# Patient Record
Sex: FEMALE | Race: WHITE | NOT HISPANIC OR LATINO | Employment: OTHER | URBAN - METROPOLITAN AREA
[De-identification: names, ages, dates, MRNs, and addresses within clinical notes are randomized per-mention and may not be internally consistent; named-entity substitution may affect disease eponyms.]

---

## 2023-11-04 ENCOUNTER — APPOINTMENT (OUTPATIENT)
Dept: RADIOLOGY | Facility: HOSPITAL | Age: 78
DRG: 190 | End: 2023-11-04
Payer: MEDICARE

## 2023-11-04 ENCOUNTER — HOSPITAL ENCOUNTER (INPATIENT)
Facility: HOSPITAL | Age: 78
LOS: 2 days | Discharge: HOME | DRG: 190 | End: 2023-11-08
Attending: INTERNAL MEDICINE | Admitting: INTERNAL MEDICINE
Payer: MEDICARE

## 2023-11-04 DIAGNOSIS — J44.1 COPD EXACERBATION (MULTI): Primary | ICD-10-CM

## 2023-11-04 LAB
ALBUMIN SERPL BCP-MCNC: 3.9 G/DL (ref 3.4–5)
ALP SERPL-CCNC: 78 U/L (ref 33–136)
ALT SERPL W P-5'-P-CCNC: 18 U/L (ref 7–45)
ANION GAP SERPL CALC-SCNC: 11 MMOL/L (ref 10–20)
AST SERPL W P-5'-P-CCNC: 18 U/L (ref 9–39)
BASOPHILS # BLD AUTO: 0.06 X10*3/UL (ref 0–0.1)
BASOPHILS NFR BLD AUTO: 0.7 %
BILIRUB SERPL-MCNC: 0.5 MG/DL (ref 0–1.2)
BNP SERPL-MCNC: 37 PG/ML (ref 0–99)
BUN SERPL-MCNC: 13 MG/DL (ref 6–23)
CALCIUM SERPL-MCNC: 8.6 MG/DL (ref 8.6–10.3)
CARDIAC TROPONIN I PNL SERPL HS: 10 NG/L (ref 0–13)
CHLORIDE SERPL-SCNC: 105 MMOL/L (ref 98–107)
CO2 SERPL-SCNC: 26 MMOL/L (ref 21–32)
CREAT SERPL-MCNC: 0.86 MG/DL (ref 0.5–1.05)
EOSINOPHIL # BLD AUTO: 0.75 X10*3/UL (ref 0–0.4)
EOSINOPHIL NFR BLD AUTO: 8.4 %
ERYTHROCYTE [DISTWIDTH] IN BLOOD BY AUTOMATED COUNT: 12.5 % (ref 11.5–14.5)
FLUAV RNA RESP QL NAA+PROBE: NOT DETECTED
FLUBV RNA RESP QL NAA+PROBE: NOT DETECTED
GFR SERPL CREATININE-BSD FRML MDRD: 68 ML/MIN/1.73M*2
GLUCOSE SERPL-MCNC: 103 MG/DL (ref 74–99)
HCT VFR BLD AUTO: 44.4 % (ref 36–46)
HGB BLD-MCNC: 14.4 G/DL (ref 12–16)
IMM GRANULOCYTES # BLD AUTO: 0.05 X10*3/UL (ref 0–0.5)
IMM GRANULOCYTES NFR BLD AUTO: 0.6 % (ref 0–0.9)
INR PPP: 1 (ref 0.9–1.1)
LYMPHOCYTES # BLD AUTO: 1.93 X10*3/UL (ref 0.8–3)
LYMPHOCYTES NFR BLD AUTO: 21.6 %
MAGNESIUM SERPL-MCNC: 1.93 MG/DL (ref 1.6–2.4)
MCH RBC QN AUTO: 30.6 PG (ref 26–34)
MCHC RBC AUTO-ENTMCNC: 32.4 G/DL (ref 32–36)
MCV RBC AUTO: 94 FL (ref 80–100)
MONOCYTES # BLD AUTO: 0.44 X10*3/UL (ref 0.05–0.8)
MONOCYTES NFR BLD AUTO: 4.9 %
NEUTROPHILS # BLD AUTO: 5.71 X10*3/UL (ref 1.6–5.5)
NEUTROPHILS NFR BLD AUTO: 63.8 %
NRBC BLD-RTO: 0 /100 WBCS (ref 0–0)
PLATELET # BLD AUTO: 122 X10*3/UL (ref 150–450)
POTASSIUM SERPL-SCNC: 3.2 MMOL/L (ref 3.5–5.3)
PROT SERPL-MCNC: 6.7 G/DL (ref 6.4–8.2)
PROTHROMBIN TIME: 11.1 SECONDS (ref 9.8–12.8)
RBC # BLD AUTO: 4.71 X10*6/UL (ref 4–5.2)
SARS-COV-2 RNA RESP QL NAA+PROBE: NOT DETECTED
SODIUM SERPL-SCNC: 139 MMOL/L (ref 136–145)
WBC # BLD AUTO: 8.9 X10*3/UL (ref 4.4–11.3)

## 2023-11-04 PROCEDURE — 83735 ASSAY OF MAGNESIUM: CPT | Performed by: PHYSICIAN ASSISTANT

## 2023-11-04 PROCEDURE — 84484 ASSAY OF TROPONIN QUANT: CPT | Performed by: PHYSICIAN ASSISTANT

## 2023-11-04 PROCEDURE — 85025 COMPLETE CBC W/AUTO DIFF WBC: CPT | Performed by: PHYSICIAN ASSISTANT

## 2023-11-04 PROCEDURE — 85610 PROTHROMBIN TIME: CPT | Performed by: PHYSICIAN ASSISTANT

## 2023-11-04 PROCEDURE — 96374 THER/PROPH/DIAG INJ IV PUSH: CPT

## 2023-11-04 PROCEDURE — 84443 ASSAY THYROID STIM HORMONE: CPT | Performed by: INTERNAL MEDICINE

## 2023-11-04 PROCEDURE — 2500000002 HC RX 250 W HCPCS SELF ADMINISTERED DRUGS (ALT 637 FOR MEDICARE OP, ALT 636 FOR OP/ED): Performed by: PHYSICIAN ASSISTANT

## 2023-11-04 PROCEDURE — 99285 EMERGENCY DEPT VISIT HI MDM: CPT | Mod: 25

## 2023-11-04 PROCEDURE — 83880 ASSAY OF NATRIURETIC PEPTIDE: CPT | Performed by: PHYSICIAN ASSISTANT

## 2023-11-04 PROCEDURE — 36415 COLL VENOUS BLD VENIPUNCTURE: CPT | Performed by: PHYSICIAN ASSISTANT

## 2023-11-04 PROCEDURE — 71045 X-RAY EXAM CHEST 1 VIEW: CPT | Mod: FOREIGN READ | Performed by: RADIOLOGY

## 2023-11-04 PROCEDURE — 80053 COMPREHEN METABOLIC PANEL: CPT | Performed by: PHYSICIAN ASSISTANT

## 2023-11-04 PROCEDURE — 71045 X-RAY EXAM CHEST 1 VIEW: CPT | Mod: FY,FR

## 2023-11-04 PROCEDURE — 87636 SARSCOV2 & INF A&B AMP PRB: CPT | Performed by: PHYSICIAN ASSISTANT

## 2023-11-04 PROCEDURE — 82607 VITAMIN B-12: CPT | Mod: GEALAB | Performed by: INTERNAL MEDICINE

## 2023-11-04 RX ORDER — ALBUTEROL SULFATE 0.83 MG/ML
2.5 SOLUTION RESPIRATORY (INHALATION) ONCE
Status: COMPLETED | OUTPATIENT
Start: 2023-11-04 | End: 2023-11-04

## 2023-11-04 RX ORDER — IPRATROPIUM BROMIDE AND ALBUTEROL SULFATE 2.5; .5 MG/3ML; MG/3ML
3 SOLUTION RESPIRATORY (INHALATION) ONCE
Status: COMPLETED | OUTPATIENT
Start: 2023-11-04 | End: 2023-11-04

## 2023-11-04 RX ADMIN — ALBUTEROL SULFATE 2.5 MG: 2.5 SOLUTION RESPIRATORY (INHALATION) at 22:23

## 2023-11-04 RX ADMIN — IPRATROPIUM BROMIDE AND ALBUTEROL SULFATE 3 ML: .5; 3 SOLUTION RESPIRATORY (INHALATION) at 22:37

## 2023-11-04 ASSESSMENT — COLUMBIA-SUICIDE SEVERITY RATING SCALE - C-SSRS
6. HAVE YOU EVER DONE ANYTHING, STARTED TO DO ANYTHING, OR PREPARED TO DO ANYTHING TO END YOUR LIFE?: NO
2. HAVE YOU ACTUALLY HAD ANY THOUGHTS OF KILLING YOURSELF?: NO
1. IN THE PAST MONTH, HAVE YOU WISHED YOU WERE DEAD OR WISHED YOU COULD GO TO SLEEP AND NOT WAKE UP?: NO

## 2023-11-04 ASSESSMENT — LIFESTYLE VARIABLES
EVER FELT BAD OR GUILTY ABOUT YOUR DRINKING: NO
REASON UNABLE TO ASSESS: NO
HAVE YOU EVER FELT YOU SHOULD CUT DOWN ON YOUR DRINKING: NO
EVER HAD A DRINK FIRST THING IN THE MORNING TO STEADY YOUR NERVES TO GET RID OF A HANGOVER: NO
HAVE PEOPLE ANNOYED YOU BY CRITICIZING YOUR DRINKING: NO

## 2023-11-04 ASSESSMENT — PAIN SCALES - GENERAL: PAINLEVEL_OUTOF10: 0 - NO PAIN

## 2023-11-04 ASSESSMENT — PAIN - FUNCTIONAL ASSESSMENT: PAIN_FUNCTIONAL_ASSESSMENT: 0-10

## 2023-11-05 PROBLEM — J44.1 COPD EXACERBATION (MULTI): Status: ACTIVE | Noted: 2023-11-05

## 2023-11-05 PROBLEM — D69.6 THROMBOCYTOPENIA (CMS-HCC): Status: ACTIVE | Noted: 2023-11-05

## 2023-11-05 PROBLEM — E03.9 HYPOTHYROIDISM: Status: ACTIVE | Noted: 2023-11-05

## 2023-11-05 PROBLEM — E87.6 HYPOKALEMIA: Status: ACTIVE | Noted: 2023-11-05

## 2023-11-05 LAB
ALBUMIN SERPL BCP-MCNC: 4.3 G/DL (ref 3.4–5)
ALP SERPL-CCNC: 82 U/L (ref 33–136)
ALT SERPL W P-5'-P-CCNC: 19 U/L (ref 7–45)
ANION GAP SERPL CALC-SCNC: 14 MMOL/L (ref 10–20)
AST SERPL W P-5'-P-CCNC: 18 U/L (ref 9–39)
BASOPHILS # BLD AUTO: 0 X10*3/UL (ref 0–0.1)
BASOPHILS NFR BLD AUTO: 0 %
BILIRUB SERPL-MCNC: 0.5 MG/DL (ref 0–1.2)
BUN SERPL-MCNC: 13 MG/DL (ref 6–23)
CALCIUM SERPL-MCNC: 9 MG/DL (ref 8.6–10.3)
CHLORIDE SERPL-SCNC: 105 MMOL/L (ref 98–107)
CO2 SERPL-SCNC: 24 MMOL/L (ref 21–32)
CREAT SERPL-MCNC: 0.83 MG/DL (ref 0.5–1.05)
EOSINOPHIL # BLD AUTO: 0 X10*3/UL (ref 0–0.4)
EOSINOPHIL NFR BLD AUTO: 0 %
ERYTHROCYTE [DISTWIDTH] IN BLOOD BY AUTOMATED COUNT: 12.5 % (ref 11.5–14.5)
FOLATE SERPL-MCNC: >24 NG/ML
GFR SERPL CREATININE-BSD FRML MDRD: 71 ML/MIN/1.73M*2
GLUCOSE SERPL-MCNC: 134 MG/DL (ref 74–99)
HCT VFR BLD AUTO: 42.4 % (ref 36–46)
HGB BLD-MCNC: 13.5 G/DL (ref 12–16)
HOLD SPECIMEN: NORMAL
IMM GRANULOCYTES # BLD AUTO: 0.04 X10*3/UL (ref 0–0.5)
IMM GRANULOCYTES NFR BLD AUTO: 0.7 % (ref 0–0.9)
LYMPHOCYTES # BLD AUTO: 0.74 X10*3/UL (ref 0.8–3)
LYMPHOCYTES NFR BLD AUTO: 12.9 %
MAGNESIUM SERPL-MCNC: 1.99 MG/DL (ref 1.6–2.4)
MCH RBC QN AUTO: 30.3 PG (ref 26–34)
MCHC RBC AUTO-ENTMCNC: 31.8 G/DL (ref 32–36)
MCV RBC AUTO: 95 FL (ref 80–100)
MONOCYTES # BLD AUTO: 0.06 X10*3/UL (ref 0.05–0.8)
MONOCYTES NFR BLD AUTO: 1 %
NEUTROPHILS # BLD AUTO: 4.89 X10*3/UL (ref 1.6–5.5)
NEUTROPHILS NFR BLD AUTO: 85.4 %
NRBC BLD-RTO: 0 /100 WBCS (ref 0–0)
PLATELET # BLD AUTO: 122 X10*3/UL (ref 150–450)
POTASSIUM SERPL-SCNC: 4.2 MMOL/L (ref 3.5–5.3)
PROT SERPL-MCNC: 7.4 G/DL (ref 6.4–8.2)
RBC # BLD AUTO: 4.46 X10*6/UL (ref 4–5.2)
SODIUM SERPL-SCNC: 139 MMOL/L (ref 136–145)
TSH SERPL-ACNC: 1.36 MIU/L (ref 0.44–3.98)
VIT B12 SERPL-MCNC: 495 PG/ML (ref 211–911)
WBC # BLD AUTO: 5.7 X10*3/UL (ref 4.4–11.3)

## 2023-11-05 PROCEDURE — G0378 HOSPITAL OBSERVATION PER HR: HCPCS

## 2023-11-05 PROCEDURE — 2500000004 HC RX 250 GENERAL PHARMACY W/ HCPCS (ALT 636 FOR OP/ED): Performed by: NURSE PRACTITIONER

## 2023-11-05 PROCEDURE — 94760 N-INVAS EAR/PLS OXIMETRY 1: CPT

## 2023-11-05 PROCEDURE — 94667 MNPJ CHEST WALL 1ST: CPT

## 2023-11-05 PROCEDURE — 94664 DEMO&/EVAL PT USE INHALER: CPT

## 2023-11-05 PROCEDURE — 99223 1ST HOSP IP/OBS HIGH 75: CPT | Performed by: INTERNAL MEDICINE

## 2023-11-05 PROCEDURE — 2500000004 HC RX 250 GENERAL PHARMACY W/ HCPCS (ALT 636 FOR OP/ED): Performed by: INTERNAL MEDICINE

## 2023-11-05 PROCEDURE — 80053 COMPREHEN METABOLIC PANEL: CPT | Performed by: INTERNAL MEDICINE

## 2023-11-05 PROCEDURE — 36415 COLL VENOUS BLD VENIPUNCTURE: CPT | Performed by: INTERNAL MEDICINE

## 2023-11-05 PROCEDURE — 82746 ASSAY OF FOLIC ACID SERUM: CPT | Mod: GEALAB | Performed by: INTERNAL MEDICINE

## 2023-11-05 PROCEDURE — 2500000002 HC RX 250 W HCPCS SELF ADMINISTERED DRUGS (ALT 637 FOR MEDICARE OP, ALT 636 FOR OP/ED): Performed by: NURSE PRACTITIONER

## 2023-11-05 PROCEDURE — 94640 AIRWAY INHALATION TREATMENT: CPT

## 2023-11-05 PROCEDURE — 36415 COLL VENOUS BLD VENIPUNCTURE: CPT | Mod: GEALAB | Performed by: INTERNAL MEDICINE

## 2023-11-05 PROCEDURE — 94668 MNPJ CHEST WALL SBSQ: CPT

## 2023-11-05 PROCEDURE — 2500000001 HC RX 250 WO HCPCS SELF ADMINISTERED DRUGS (ALT 637 FOR MEDICARE OP): Performed by: INTERNAL MEDICINE

## 2023-11-05 PROCEDURE — 2500000002 HC RX 250 W HCPCS SELF ADMINISTERED DRUGS (ALT 637 FOR MEDICARE OP, ALT 636 FOR OP/ED): Performed by: INTERNAL MEDICINE

## 2023-11-05 PROCEDURE — 85025 COMPLETE CBC W/AUTO DIFF WBC: CPT | Performed by: INTERNAL MEDICINE

## 2023-11-05 PROCEDURE — 83735 ASSAY OF MAGNESIUM: CPT | Performed by: INTERNAL MEDICINE

## 2023-11-05 RX ORDER — BUDESONIDE 0.5 MG/2ML
0.5 INHALANT ORAL
Status: DISCONTINUED | OUTPATIENT
Start: 2023-11-05 | End: 2023-11-08 | Stop reason: HOSPADM

## 2023-11-05 RX ORDER — LEVOTHYROXINE SODIUM 112 UG/1
112 TABLET ORAL
COMMUNITY

## 2023-11-05 RX ORDER — CITALOPRAM 20 MG/1
20 TABLET, FILM COATED ORAL DAILY
COMMUNITY

## 2023-11-05 RX ORDER — POTASSIUM CHLORIDE 20 MEQ/1
40 TABLET, EXTENDED RELEASE ORAL ONCE
Status: COMPLETED | OUTPATIENT
Start: 2023-11-05 | End: 2023-11-05

## 2023-11-05 RX ORDER — BUDESONIDE 0.5 MG/2ML
0.5 INHALANT ORAL 2 TIMES DAILY
COMMUNITY
End: 2024-01-30 | Stop reason: ALTCHOICE

## 2023-11-05 RX ORDER — CETIRIZINE HYDROCHLORIDE 10 MG/1
1 TABLET ORAL DAILY
COMMUNITY

## 2023-11-05 RX ORDER — NAPROXEN 500 MG/1
500 TABLET ORAL ONCE
Status: COMPLETED | OUTPATIENT
Start: 2023-11-05 | End: 2023-11-05

## 2023-11-05 RX ORDER — IPRATROPIUM BROMIDE AND ALBUTEROL SULFATE 2.5; .5 MG/3ML; MG/3ML
3 SOLUTION RESPIRATORY (INHALATION)
Status: DISCONTINUED | OUTPATIENT
Start: 2023-11-05 | End: 2023-11-05

## 2023-11-05 RX ORDER — AZITHROMYCIN 500 MG/1
500 TABLET, FILM COATED ORAL
Status: DISCONTINUED | OUTPATIENT
Start: 2023-11-05 | End: 2023-11-05

## 2023-11-05 RX ORDER — LEVOTHYROXINE SODIUM 112 UG/1
112 TABLET ORAL
Status: DISCONTINUED | OUTPATIENT
Start: 2023-11-05 | End: 2023-11-08 | Stop reason: HOSPADM

## 2023-11-05 RX ORDER — POTASSIUM CHLORIDE 20 MEQ/1
40 TABLET, EXTENDED RELEASE ORAL ONCE
Status: DISCONTINUED | OUTPATIENT
Start: 2023-11-05 | End: 2023-11-06

## 2023-11-05 RX ORDER — MONTELUKAST SODIUM 10 MG/1
10 TABLET ORAL NIGHTLY
COMMUNITY

## 2023-11-05 RX ORDER — GUAIFENESIN 600 MG/1
1200 TABLET, EXTENDED RELEASE ORAL 2 TIMES DAILY
Status: DISCONTINUED | OUTPATIENT
Start: 2023-11-05 | End: 2023-11-08 | Stop reason: HOSPADM

## 2023-11-05 RX ORDER — ERGOCALCIFEROL 1.25 MG/1
1.25 CAPSULE ORAL
COMMUNITY

## 2023-11-05 RX ORDER — ALBUTEROL SULFATE 0.83 MG/ML
2.5 SOLUTION RESPIRATORY (INHALATION) EVERY 2 HOUR PRN
Status: DISCONTINUED | OUTPATIENT
Start: 2023-11-05 | End: 2023-11-08 | Stop reason: HOSPADM

## 2023-11-05 RX ORDER — GUAIFENESIN 600 MG/1
1200 TABLET, EXTENDED RELEASE ORAL 2 TIMES DAILY
COMMUNITY
End: 2023-11-08 | Stop reason: HOSPADM

## 2023-11-05 RX ORDER — MONTELUKAST SODIUM 10 MG/1
10 TABLET ORAL NIGHTLY
Status: DISCONTINUED | OUTPATIENT
Start: 2023-11-05 | End: 2023-11-08 | Stop reason: HOSPADM

## 2023-11-05 RX ORDER — ALBUTEROL SULFATE 0.83 MG/ML
2.5 SOLUTION RESPIRATORY (INHALATION)
Status: DISCONTINUED | OUTPATIENT
Start: 2023-11-05 | End: 2023-11-05

## 2023-11-05 RX ORDER — LORATADINE 10 MG/1
10 TABLET ORAL DAILY
Status: DISCONTINUED | OUTPATIENT
Start: 2023-11-05 | End: 2023-11-08 | Stop reason: HOSPADM

## 2023-11-05 RX ORDER — MULTIVIT-MIN/IRON FUM/FOLIC AC 7.5 MG-4
1 TABLET ORAL DAILY
Status: DISCONTINUED | OUTPATIENT
Start: 2023-11-05 | End: 2023-11-08 | Stop reason: HOSPADM

## 2023-11-05 RX ORDER — CITALOPRAM 20 MG/1
20 TABLET, FILM COATED ORAL DAILY
Status: DISCONTINUED | OUTPATIENT
Start: 2023-11-05 | End: 2023-11-08 | Stop reason: HOSPADM

## 2023-11-05 RX ORDER — ALBUTEROL SULFATE 0.83 MG/ML
2.5 SOLUTION RESPIRATORY (INHALATION)
COMMUNITY

## 2023-11-05 RX ORDER — BISMUTH SUBSALICYLATE 262 MG
1 TABLET,CHEWABLE ORAL DAILY
Status: DISCONTINUED | OUTPATIENT
Start: 2023-11-05 | End: 2023-11-05

## 2023-11-05 RX ORDER — BISMUTH SUBSALICYLATE 262 MG
1 TABLET,CHEWABLE ORAL DAILY
COMMUNITY
End: 2024-01-30 | Stop reason: SDUPTHER

## 2023-11-05 RX ORDER — DOXYCYCLINE HYCLATE 100 MG
100 TABLET ORAL EVERY 12 HOURS SCHEDULED
Status: DISCONTINUED | OUTPATIENT
Start: 2023-11-05 | End: 2023-11-08 | Stop reason: HOSPADM

## 2023-11-05 RX ORDER — IPRATROPIUM BROMIDE AND ALBUTEROL SULFATE 2.5; .5 MG/3ML; MG/3ML
3 SOLUTION RESPIRATORY (INHALATION)
Status: DISCONTINUED | OUTPATIENT
Start: 2023-11-05 | End: 2023-11-08 | Stop reason: HOSPADM

## 2023-11-05 RX ADMIN — IPRATROPIUM BROMIDE AND ALBUTEROL SULFATE 3 ML: 2.5; .5 SOLUTION RESPIRATORY (INHALATION) at 12:55

## 2023-11-05 RX ADMIN — NAPROXEN 500 MG: 500 TABLET ORAL at 15:38

## 2023-11-05 RX ADMIN — MONTELUKAST 10 MG: 10 TABLET, FILM COATED ORAL at 20:04

## 2023-11-05 RX ADMIN — POTASSIUM CHLORIDE 40 MEQ: 1500 TABLET, EXTENDED RELEASE ORAL at 09:20

## 2023-11-05 RX ADMIN — BUDESONIDE 0.5 MG: 0.5 INHALANT RESPIRATORY (INHALATION) at 19:49

## 2023-11-05 RX ADMIN — METHYLPREDNISOLONE SODIUM SUCCINATE 40 MG: 40 INJECTION, POWDER, FOR SOLUTION INTRAMUSCULAR; INTRAVENOUS at 15:38

## 2023-11-05 RX ADMIN — DOXYCYCLINE HYCLATE 100 MG: 100 TABLET, COATED ORAL at 20:04

## 2023-11-05 RX ADMIN — LORATADINE 10 MG: 10 TABLET ORAL at 09:20

## 2023-11-05 RX ADMIN — METHYLPREDNISOLONE SODIUM SUCCINATE 40 MG: 40 INJECTION, POWDER, FOR SOLUTION INTRAMUSCULAR; INTRAVENOUS at 01:07

## 2023-11-05 RX ADMIN — GUAIFENESIN 1200 MG: 600 TABLET ORAL at 20:04

## 2023-11-05 RX ADMIN — METHYLPREDNISOLONE SODIUM SUCCINATE 40 MG: 40 INJECTION, POWDER, FOR SOLUTION INTRAMUSCULAR; INTRAVENOUS at 09:20

## 2023-11-05 RX ADMIN — IPRATROPIUM BROMIDE AND ALBUTEROL SULFATE 3 ML: 2.5; .5 SOLUTION RESPIRATORY (INHALATION) at 19:49

## 2023-11-05 RX ADMIN — BUDESONIDE 0.5 MG: 0.5 INHALANT RESPIRATORY (INHALATION) at 09:07

## 2023-11-05 RX ADMIN — CITALOPRAM HYDROBROMIDE 20 MG: 20 TABLET ORAL at 09:20

## 2023-11-05 RX ADMIN — ALBUTEROL SULFATE 2.5 MG: 2.5 SOLUTION RESPIRATORY (INHALATION) at 02:49

## 2023-11-05 RX ADMIN — MULTIPLE VITAMINS W/ MINERALS TAB 1 TABLET: TAB at 09:20

## 2023-11-05 RX ADMIN — METHYLPREDNISOLONE SODIUM SUCCINATE 40 MG: 40 INJECTION, POWDER, FOR SOLUTION INTRAMUSCULAR; INTRAVENOUS at 20:04

## 2023-11-05 RX ADMIN — GUAIFENESIN 1200 MG: 600 TABLET ORAL at 09:19

## 2023-11-05 RX ADMIN — IPRATROPIUM BROMIDE AND ALBUTEROL SULFATE 3 ML: 2.5; .5 SOLUTION RESPIRATORY (INHALATION) at 09:08

## 2023-11-05 RX ADMIN — DOXYCYCLINE HYCLATE 100 MG: 100 TABLET, COATED ORAL at 09:20

## 2023-11-05 SDOH — ECONOMIC STABILITY: INCOME INSECURITY: IN THE LAST 12 MONTHS, WAS THERE A TIME WHEN YOU WERE NOT ABLE TO PAY THE MORTGAGE OR RENT ON TIME?: NO

## 2023-11-05 SDOH — SOCIAL STABILITY: SOCIAL INSECURITY
WITHIN THE LAST YEAR, HAVE TO BEEN RAPED OR FORCED TO HAVE ANY KIND OF SEXUAL ACTIVITY BY YOUR PARTNER OR EX-PARTNER?: NO

## 2023-11-05 SDOH — ECONOMIC STABILITY: INCOME INSECURITY: HOW HARD IS IT FOR YOU TO PAY FOR THE VERY BASICS LIKE FOOD, HOUSING, MEDICAL CARE, AND HEATING?: NOT HARD AT ALL

## 2023-11-05 SDOH — SOCIAL STABILITY: SOCIAL INSECURITY: WERE YOU ABLE TO COMPLETE ALL THE BEHAVIORAL HEALTH SCREENINGS?: YES

## 2023-11-05 SDOH — SOCIAL STABILITY: SOCIAL INSECURITY
WITHIN THE LAST YEAR, HAVE YOU BEEN KICKED, HIT, SLAPPED, OR OTHERWISE PHYSICALLY HURT BY YOUR PARTNER OR EX-PARTNER?: NO

## 2023-11-05 SDOH — HEALTH STABILITY: PHYSICAL HEALTH: ON AVERAGE, HOW MANY DAYS PER WEEK DO YOU ENGAGE IN MODERATE TO STRENUOUS EXERCISE (LIKE A BRISK WALK)?: 0 DAYS

## 2023-11-05 SDOH — ECONOMIC STABILITY: INCOME INSECURITY: IN THE PAST 12 MONTHS, HAS THE ELECTRIC, GAS, OIL, OR WATER COMPANY THREATENED TO SHUT OFF SERVICE IN YOUR HOME?: NO

## 2023-11-05 SDOH — HEALTH STABILITY: MENTAL HEALTH
STRESS IS WHEN SOMEONE FEELS TENSE, NERVOUS, ANXIOUS, OR CAN'T SLEEP AT NIGHT BECAUSE THEIR MIND IS TROUBLED. HOW STRESSED ARE YOU?: TO SOME EXTENT

## 2023-11-05 SDOH — SOCIAL STABILITY: SOCIAL INSECURITY: DO YOU FEEL UNSAFE GOING BACK TO THE PLACE WHERE YOU ARE LIVING?: NO

## 2023-11-05 SDOH — SOCIAL STABILITY: SOCIAL INSECURITY: WITHIN THE LAST YEAR, HAVE YOU BEEN AFRAID OF YOUR PARTNER OR EX-PARTNER?: NO

## 2023-11-05 SDOH — SOCIAL STABILITY: SOCIAL NETWORK: HOW OFTEN DO YOU GET TOGETHER WITH FRIENDS OR RELATIVES?: MORE THAN THREE TIMES A WEEK

## 2023-11-05 SDOH — ECONOMIC STABILITY: TRANSPORTATION INSECURITY
IN THE PAST 12 MONTHS, HAS LACK OF TRANSPORTATION KEPT YOU FROM MEETINGS, WORK, OR FROM GETTING THINGS NEEDED FOR DAILY LIVING?: NO

## 2023-11-05 SDOH — SOCIAL STABILITY: SOCIAL NETWORK
DO YOU BELONG TO ANY CLUBS OR ORGANIZATIONS SUCH AS CHURCH GROUPS UNIONS, FRATERNAL OR ATHLETIC GROUPS, OR SCHOOL GROUPS?: NO

## 2023-11-05 SDOH — SOCIAL STABILITY: SOCIAL INSECURITY: WITHIN THE LAST YEAR, HAVE YOU BEEN HUMILIATED OR EMOTIONALLY ABUSED IN OTHER WAYS BY YOUR PARTNER OR EX-PARTNER?: NO

## 2023-11-05 SDOH — SOCIAL STABILITY: SOCIAL NETWORK: HOW OFTEN DO YOU ATTEND CHURCH OR RELIGIOUS SERVICES?: NEVER

## 2023-11-05 SDOH — ECONOMIC STABILITY: FOOD INSECURITY: WITHIN THE PAST 12 MONTHS, YOU WORRIED THAT YOUR FOOD WOULD RUN OUT BEFORE YOU GOT MONEY TO BUY MORE.: NEVER TRUE

## 2023-11-05 SDOH — SOCIAL STABILITY: SOCIAL NETWORK: ARE YOU MARRIED, WIDOWED, DIVORCED, SEPARATED, NEVER MARRIED, OR LIVING WITH A PARTNER?: PATIENT DECLINED

## 2023-11-05 SDOH — ECONOMIC STABILITY: FOOD INSECURITY: WITHIN THE PAST 12 MONTHS, THE FOOD YOU BOUGHT JUST DIDN'T LAST AND YOU DIDN'T HAVE MONEY TO GET MORE.: NEVER TRUE

## 2023-11-05 SDOH — ECONOMIC STABILITY: HOUSING INSECURITY
IN THE LAST 12 MONTHS, WAS THERE A TIME WHEN YOU DID NOT HAVE A STEADY PLACE TO SLEEP OR SLEPT IN A SHELTER (INCLUDING NOW)?: NO

## 2023-11-05 SDOH — SOCIAL STABILITY: SOCIAL INSECURITY: DO YOU FEEL ANYONE HAS EXPLOITED OR TAKEN ADVANTAGE OF YOU FINANCIALLY OR OF YOUR PERSONAL PROPERTY?: NO

## 2023-11-05 SDOH — SOCIAL STABILITY: SOCIAL NETWORK: HOW OFTEN DO YOU ATTENT MEETINGS OF THE CLUB OR ORGANIZATION YOU BELONG TO?: NEVER

## 2023-11-05 SDOH — SOCIAL STABILITY: SOCIAL NETWORK
IN A TYPICAL WEEK, HOW MANY TIMES DO YOU TALK ON THE PHONE WITH FAMILY, FRIENDS, OR NEIGHBORS?: MORE THAN THREE TIMES A WEEK

## 2023-11-05 SDOH — SOCIAL STABILITY: SOCIAL INSECURITY: ARE YOU OR HAVE YOU BEEN THREATENED OR ABUSED PHYSICALLY, EMOTIONALLY, OR SEXUALLY BY ANYONE?: NO

## 2023-11-05 SDOH — SOCIAL STABILITY: SOCIAL INSECURITY: HAVE YOU HAD THOUGHTS OF HARMING ANYONE ELSE?: NO

## 2023-11-05 SDOH — HEALTH STABILITY: PHYSICAL HEALTH: ON AVERAGE, HOW MANY MINUTES DO YOU ENGAGE IN EXERCISE AT THIS LEVEL?: 0 MIN

## 2023-11-05 SDOH — ECONOMIC STABILITY: TRANSPORTATION INSECURITY
IN THE PAST 12 MONTHS, HAS THE LACK OF TRANSPORTATION KEPT YOU FROM MEDICAL APPOINTMENTS OR FROM GETTING MEDICATIONS?: NO

## 2023-11-05 SDOH — SOCIAL STABILITY: SOCIAL INSECURITY: ABUSE: ADULT

## 2023-11-05 SDOH — ECONOMIC STABILITY: HOUSING INSECURITY: IN THE LAST 12 MONTHS, HOW MANY PLACES HAVE YOU LIVED?: 1

## 2023-11-05 SDOH — SOCIAL STABILITY: SOCIAL INSECURITY: HAS ANYONE EVER THREATENED TO HURT YOUR FAMILY OR YOUR PETS?: NO

## 2023-11-05 SDOH — SOCIAL STABILITY: SOCIAL INSECURITY: DOES ANYONE TRY TO KEEP YOU FROM HAVING/CONTACTING OTHER FRIENDS OR DOING THINGS OUTSIDE YOUR HOME?: NO

## 2023-11-05 ASSESSMENT — COGNITIVE AND FUNCTIONAL STATUS - GENERAL
DAILY ACTIVITIY SCORE: 24
DAILY ACTIVITIY SCORE: 24
MOBILITY SCORE: 24
TOILETING: A LITTLE
DAILY ACTIVITIY SCORE: 21
MOBILITY SCORE: 22
PATIENT BASELINE BEDBOUND: NO
WALKING IN HOSPITAL ROOM: A LITTLE
HELP NEEDED FOR BATHING: A LITTLE
CLIMB 3 TO 5 STEPS WITH RAILING: A LITTLE
MOBILITY SCORE: 24
DRESSING REGULAR LOWER BODY CLOTHING: A LITTLE

## 2023-11-05 ASSESSMENT — ENCOUNTER SYMPTOMS
COUGH: 0
ABDOMINAL PAIN: 0
GASTROINTESTINAL NEGATIVE: 1
PALPITATIONS: 0
DIZZINESS: 0
DIARRHEA: 0
ENDOCRINE NEGATIVE: 1
WOUND: 0
PSYCHIATRIC NEGATIVE: 1
DYSPHORIC MOOD: 0
EYES NEGATIVE: 1
WHEEZING: 1
NAUSEA: 0
HEMATURIA: 0
HEADACHES: 0
VOMITING: 0
NERVOUS/ANXIOUS: 0
EYE PAIN: 0
SORE THROAT: 0
MUSCULOSKELETAL NEGATIVE: 1
CONSTITUTIONAL NEGATIVE: 1
SHORTNESS OF BREATH: 1
HEMATOLOGIC/LYMPHATIC NEGATIVE: 1
FEVER: 0
ARTHRALGIAS: 0
CHILLS: 0
ALLERGIC/IMMUNOLOGIC NEGATIVE: 1
BACK PAIN: 0
DYSURIA: 0
NEUROLOGICAL NEGATIVE: 1

## 2023-11-05 ASSESSMENT — ACTIVITIES OF DAILY LIVING (ADL)
ADEQUATE_TO_COMPLETE_ADL: YES
HEARING - LEFT EAR: FUNCTIONAL
PATIENT'S MEMORY ADEQUATE TO SAFELY COMPLETE DAILY ACTIVITIES?: YES
JUDGMENT_ADEQUATE_SAFELY_COMPLETE_DAILY_ACTIVITIES: YES
GROOMING: INDEPENDENT
FEEDING YOURSELF: INDEPENDENT
DRESSING YOURSELF: INDEPENDENT
TOILETING: INDEPENDENT
HEARING - RIGHT EAR: FUNCTIONAL
LACK_OF_TRANSPORTATION: NO
WALKS IN HOME: INDEPENDENT
BATHING: INDEPENDENT

## 2023-11-05 ASSESSMENT — COLUMBIA-SUICIDE SEVERITY RATING SCALE - C-SSRS
1. IN THE PAST MONTH, HAVE YOU WISHED YOU WERE DEAD OR WISHED YOU COULD GO TO SLEEP AND NOT WAKE UP?: NO
5. HAVE YOU STARTED TO WORK OUT OR WORKED OUT THE DETAILS OF HOW TO KILL YOURSELF? DO YOU INTEND TO CARRY OUT THIS PLAN?: NO
2. HAVE YOU ACTUALLY HAD ANY THOUGHTS OF KILLING YOURSELF?: NO
4. HAVE YOU HAD THESE THOUGHTS AND HAD SOME INTENTION OF ACTING ON THEM?: NO

## 2023-11-05 ASSESSMENT — LIFESTYLE VARIABLES
SKIP TO QUESTIONS 9-10: 1
HOW MANY STANDARD DRINKS CONTAINING ALCOHOL DO YOU HAVE ON A TYPICAL DAY: PATIENT DOES NOT DRINK
SUBSTANCE_ABUSE_PAST_12_MONTHS: NO
PRESCIPTION_ABUSE_PAST_12_MONTHS: NO
AUDIT-C TOTAL SCORE: 0
AUDIT-C TOTAL SCORE: 0
HOW OFTEN DO YOU HAVE A DRINK CONTAINING ALCOHOL: NEVER
HOW OFTEN DO YOU HAVE 6 OR MORE DRINKS ON ONE OCCASION: NEVER

## 2023-11-05 ASSESSMENT — PAIN SCALES - GENERAL
PAINLEVEL_OUTOF10: 0 - NO PAIN
PAINLEVEL_OUTOF10: 0 - NO PAIN
PAINLEVEL_OUTOF10: 3
PAINLEVEL_OUTOF10: 0 - NO PAIN

## 2023-11-05 ASSESSMENT — PATIENT HEALTH QUESTIONNAIRE - PHQ9
1. LITTLE INTEREST OR PLEASURE IN DOING THINGS: NOT AT ALL
SUM OF ALL RESPONSES TO PHQ9 QUESTIONS 1 & 2: 0
2. FEELING DOWN, DEPRESSED OR HOPELESS: NOT AT ALL

## 2023-11-05 ASSESSMENT — PAIN - FUNCTIONAL ASSESSMENT
PAIN_FUNCTIONAL_ASSESSMENT: 0-10

## 2023-11-05 NOTE — PROGRESS NOTES
History Of Present Illness  Margaux Harris is a 78 y.o. female with a history of arthritis, COPD, hypothyroidism who presented to the emergency department late on November 4 complaining of shortness of breath and wheezing.  She was brought in via EMS who gave her 2 DuoNeb's, 125 mg of IV Solu-Medrol, and 25 mg of IV Benadryl.  She noted a history of COPD but was between doctors and had logistic issues getting her medications filled.  She reported being out of her regular COPD medications for almost a month.  She also gets allergy shots but has not been able to get those for some time due to these changes.  She noted her current shortness of breath and wheezing started 2 days prior to presentation.  By the time of arrival to the ED, she reported feeling much better.  She denied chest pain, palpitations, cough, fevers, or vomiting.  Despite feeling better, ED notes reports she was still having diffuse bilateral wheezing.  She denied sick contacts.    Laboratory evaluation in the emergency department was notable for potassium 3.2, glucose 103, and platelets 122.  Creatinine, LFTs, magnesium, troponin, INR, white blood cell count, and hemoglobin were unremarkable.  COVID and influenza testing was negative.  Portable chest x-ray was obtained and was unremarkable.  Therapeutic interventions in the emergency department included DuoNeb and albuterol neb x1 along with Solu-Medrol 40 mg IV every 8 hours.  She was admitted for further evaluation and treatment.     Past Medical History  She has a past medical history of Arthritis, COPD (chronic obstructive pulmonary disease) (CMS/[facility]), Disease of thyroid gland, and Personal history of other diseases of the respiratory system (01/13/2015).    Surgical History  She has a past surgical history that includes Cholecystectomy (09/16/2013); Other surgical history (09/20/2013); Appendectomy (09/20/2013); Incision and drainage of wound (09/20/2013); Dilation and curettage of uterus  (09/20/2013); and Tonsillectomy (09/20/2013).     Social History  She reports that she quit smoking about 31 years ago. Her smoking use included cigarettes. She does not have any smokeless tobacco history on file. She reports that she does not currently use alcohol. She reports that she does not currently use drugs.    Family History  Family History   Problem Relation Name Age of Onset    Heart failure Mother          Allergies  Patient has no known allergies.    Review of Systems   Constitutional:  Negative for chills and fever.   HENT:  Negative for postnasal drip and sore throat.    Eyes:  Negative for pain and visual disturbance.   Respiratory:  Positive for shortness of breath and wheezing. Negative for cough.    Cardiovascular:  Negative for chest pain, palpitations and leg swelling.   Gastrointestinal:  Negative for abdominal pain, diarrhea, nausea and vomiting.   Genitourinary:  Negative for dysuria and hematuria.   Musculoskeletal:  Negative for arthralgias and back pain.   Skin:  Negative for rash and wound.   Neurological:  Negative for dizziness and headaches.   Psychiatric/Behavioral:  Negative for dysphoric mood. The patient is not nervous/anxious.         Physical Exam  Vitals and nursing note reviewed.   Constitutional:       General: She is not in acute distress.     Appearance: She is obese. She is ill-appearing.   HENT:      Head: Normocephalic and atraumatic.      Right Ear: External ear normal.      Left Ear: External ear normal.      Nose: Nose normal. No rhinorrhea.      Mouth/Throat:      Mouth: Mucous membranes are moist.      Pharynx: Oropharynx is clear. No oropharyngeal exudate.   Eyes:      General: No scleral icterus.        Right eye: No discharge.         Left eye: No discharge.      Conjunctiva/sclera: Conjunctivae normal.   Cardiovascular:      Rate and Rhythm: Normal rate and regular rhythm.      Pulses: Normal pulses.      Heart sounds: Normal heart sounds. No murmur  heard.  Pulmonary:      Effort: Pulmonary effort is normal. No respiratory distress.      Breath sounds: Wheezing present. No rales.   Abdominal:      General: Abdomen is flat. There is no distension.      Palpations: Abdomen is soft.      Tenderness: There is no abdominal tenderness.   Musculoskeletal:         General: No tenderness.      Right lower leg: No edema.      Left lower leg: No edema.   Skin:     General: Skin is warm and dry.      Capillary Refill: Capillary refill takes less than 2 seconds.      Findings: No rash.   Neurological:      General: No focal deficit present.      Mental Status: She is alert and oriented to person, place, and time. Mental status is at baseline.   Psychiatric:         Mood and Affect: Mood normal.         Behavior: Behavior normal.         Thought Content: Thought content normal.         Judgment: Judgment normal.          Last Recorded Vitals  /63   Pulse 79   Temp 36.6 °C (97.9 °F) (Temporal)   Resp 18   Wt 94.3 kg (207 lb 14.3 oz)   SpO2 96%     Relevant Results        Results for orders placed or performed during the hospital encounter of 11/04/23 (from the past 24 hour(s))   Light Blue Top   Result Value Ref Range    Extra Tube Hold for add-ons.    PST Top   Result Value Ref Range    Extra Tube Hold for add-ons.    Lavender Top   Result Value Ref Range    Extra Tube Hold for add-ons.    SST TOP   Result Value Ref Range    Extra Tube Hold for add-ons.    CBC and Auto Differential   Result Value Ref Range    WBC 8.9 4.4 - 11.3 x10*3/uL    nRBC 0.0 0.0 - 0.0 /100 WBCs    RBC 4.71 4.00 - 5.20 x10*6/uL    Hemoglobin 14.4 12.0 - 16.0 g/dL    Hematocrit 44.4 36.0 - 46.0 %    MCV 94 80 - 100 fL    MCH 30.6 26.0 - 34.0 pg    MCHC 32.4 32.0 - 36.0 g/dL    RDW 12.5 11.5 - 14.5 %    Platelets 122 (L) 150 - 450 x10*3/uL    Neutrophils % 63.8 40.0 - 80.0 %    Immature Granulocytes %, Automated 0.6 0.0 - 0.9 %    Lymphocytes % 21.6 13.0 - 44.0 %    Monocytes % 4.9 2.0 - 10.0  %    Eosinophils % 8.4 0.0 - 6.0 %    Basophils % 0.7 0.0 - 2.0 %    Neutrophils Absolute 5.71 (H) 1.60 - 5.50 x10*3/uL    Immature Granulocytes Absolute, Automated 0.05 0.00 - 0.50 x10*3/uL    Lymphocytes Absolute 1.93 0.80 - 3.00 x10*3/uL    Monocytes Absolute 0.44 0.05 - 0.80 x10*3/uL    Eosinophils Absolute 0.75 (H) 0.00 - 0.40 x10*3/uL    Basophils Absolute 0.06 0.00 - 0.10 x10*3/uL   Influenza A, and B PCR   Result Value Ref Range    Flu A Result Not Detected Not Detected    Flu B Result Not Detected Not Detected   Sars-CoV-2 PCR, Symptomatic   Result Value Ref Range    Coronavirus 2019, PCR Not Detected Not Detected   Comprehensive metabolic panel   Result Value Ref Range    Glucose 103 (H) 74 - 99 mg/dL    Sodium 139 136 - 145 mmol/L    Potassium 3.2 (L) 3.5 - 5.3 mmol/L    Chloride 105 98 - 107 mmol/L    Bicarbonate 26 21 - 32 mmol/L    Anion Gap 11 10 - 20 mmol/L    Urea Nitrogen 13 6 - 23 mg/dL    Creatinine 0.86 0.50 - 1.05 mg/dL    eGFR 68 >60 mL/min/1.73m*2    Calcium 8.6 8.6 - 10.3 mg/dL    Albumin 3.9 3.4 - 5.0 g/dL    Alkaline Phosphatase 78 33 - 136 U/L    Total Protein 6.7 6.4 - 8.2 g/dL    AST 18 9 - 39 U/L    Bilirubin, Total 0.5 0.0 - 1.2 mg/dL    ALT 18 7 - 45 U/L   Magnesium   Result Value Ref Range    Magnesium 1.93 1.60 - 2.40 mg/dL   Protime-INR   Result Value Ref Range    Protime 11.1 9.8 - 12.8 seconds    INR 1.0 0.9 - 1.1   Troponin I, High Sensitivity   Result Value Ref Range    Troponin I, High Sensitivity 10 0 - 13 ng/L   B-Type Natriuretic Peptide   Result Value Ref Range    BNP 37 0 - 99 pg/mL          Assessment/Plan   Principal Problem:    COPD exacerbation (CMS/HCC)  Active Problems:    Thrombocytopenia (CMS/HCC)    Hypokalemia    Hypothyroidism      COPD Exacerbation  -Continue Solu-Medrol 40 mg IV every 8 hours as diffuse wheezing persists  -Continue 3 times daily DuoNebs and add twice daily budesonide nebs  -Add doxycycline 100 mg twice daily  -Wean treatment as  tolerated  -Continue other home meds addressing allergies including nonsedating antihistamine, montelukast, and guaifenesin    Hypokalemia  -Recheck potassium and magnesium levels this morning and correct as appropriate  -Continue to follow daily    Depression/anxiety  -Continue home citalopram    Hypothyroidism  -Continue home levothyroxine and check TSH with reflex    Thrombocytopenia  -Presenting platelet count was 122, mildly decreased.  Only comparison in system is from 2019 where it was 130.  This suggests it is a chronic issue.  Continue to monitor during hospitalization.  -Check vitamin B12 and folate levels.       Marcelo Alcantra MD

## 2023-11-05 NOTE — H&P
History Of Present Illness  Margaux Harris is an 78 y.o. female with history of COPD presenting with SOB. She reports being in her USOH until 1-1.5 weeks ago when she became progressively SOB. She states that she ran out of her home MDIs a month ago and was only able to resume taking them several days ago. She says she has been using her late dad's oxygen machine and using duoneb at home without much improvement. Last night, she was on the phone when she became acutely SOB and had a feeling of impending doom. She admits to wheezing and cough productive of yellow colored sputum. No chest pain, leg edema, fever, chills or sweats. She called EMS and was brought to the ED. Chest imaging study did not show any acute cardiopulmonary process.      Past Medical History  Past Medical History:   Diagnosis Date    Arthritis     COPD (chronic obstructive pulmonary disease) (CMS/HCC)     Disease of thyroid gland     Personal history of other diseases of the respiratory system 01/13/2015    History of acute bronchitis       Surgical History  Past Surgical History:   Procedure Laterality Date    APPENDECTOMY  09/20/2013    Appendectomy    CHOLECYSTECTOMY  09/16/2013    Cholecystectomy Laparoscopic    DILATION AND CURETTAGE OF UTERUS  09/20/2013    Dilation And Curettage Of Cervical Stump    INCISION AND DRAINAGE OF WOUND  09/20/2013    Incision And Drainage Of Skin Abscess    OTHER SURGICAL HISTORY  09/20/2013    Ovarian Cystectomy    TONSILLECTOMY  09/20/2013    Tonsillectomy        Social History  She reports that she quit smoking about 31 years ago. Her smoking use included cigarettes. She does not have any smokeless tobacco history on file. She reports that she does not currently use alcohol. She reports that she does not currently use drugs.    Family History  Family History   Problem Relation Name Age of Onset    Heart failure Mother          Allergies  Patient has no known allergies.    Review of Systems   Constitutional:  Negative.    HENT: Negative.     Eyes: Negative.    Respiratory:          See HPI   Cardiovascular:  Negative for chest pain, palpitations and leg swelling.   Gastrointestinal: Negative.    Endocrine: Negative.    Genitourinary: Negative.    Musculoskeletal: Negative.    Skin: Negative.    Allergic/Immunologic: Negative.    Neurological: Negative.    Hematological: Negative.    Psychiatric/Behavioral: Negative.          Physical Exam  Constitutional:       General: She is in acute distress.      Appearance: She is not toxic-appearing or diaphoretic.      Comments: Elderly female in mild to moderate respiratory distress at rest.   HENT:      Head: Normocephalic and atraumatic.      Nose: Nose normal.      Mouth/Throat:      Pharynx: Oropharynx is clear. No oropharyngeal exudate or posterior oropharyngeal erythema.   Eyes:      General: No scleral icterus.        Right eye: No discharge.         Left eye: No discharge.      Conjunctiva/sclera: Conjunctivae normal.   Cardiovascular:      Rate and Rhythm: Normal rate and regular rhythm.      Heart sounds: No murmur heard.  Pulmonary:      Breath sounds: Wheezing and rhonchi present. No rales.   Abdominal:      General: There is no distension.      Palpations: Abdomen is soft. There is no mass.      Tenderness: There is no abdominal tenderness. There is no right CVA tenderness, left CVA tenderness or guarding.   Musculoskeletal:      Cervical back: Neck supple.      Right lower leg: No edema.      Left lower leg: No edema.   Lymphadenopathy:      Cervical: No cervical adenopathy.   Skin:     General: Skin is warm and dry.      Findings: No lesion or rash.   Neurological:      General: No focal deficit present.      Mental Status: She is alert and oriented to person, place, and time.   Psychiatric:         Mood and Affect: Mood normal.         Behavior: Behavior normal.          Last Recorded Vitals  Blood pressure 135/66, pulse 87, temperature 36.5 °C (97.7 °F), resp.  "rate 20, height 1.702 m (5' 7\"), weight 94.4 kg (208 lb 1.8 oz), SpO2 94 %.    Relevant Results     Latest Reference Range & Units 11/04/23 22:02 11/04/23 22:17 11/04/23 22:37   GLUCOSE 74 - 99 mg/dL   103 (H)   SODIUM 136 - 145 mmol/L   139   POTASSIUM 3.5 - 5.3 mmol/L   3.2 (L)   CHLORIDE 98 - 107 mmol/L   105   Bicarbonate 21 - 32 mmol/L   26   Anion Gap 10 - 20 mmol/L   11   Blood Urea Nitrogen 6 - 23 mg/dL   13   Creatinine 0.50 - 1.05 mg/dL   0.86   EGFR >60 mL/min/1.73m*2   68   Calcium 8.6 - 10.3 mg/dL   8.6   Albumin 3.4 - 5.0 g/dL   3.9   Alkaline Phosphatase 33 - 136 U/L   78   ALT 7 - 45 U/L   18   AST 9 - 39 U/L   18   Bilirubin Total 0.0 - 1.2 mg/dL   0.5   Total Protein 6.4 - 8.2 g/dL   6.7   MAGNESIUM 1.60 - 2.40 mg/dL   1.93   BNP 0 - 99 pg/mL   37   Troponin I, High Sensitivity 0 - 13 ng/L   10   INR 0.9 - 1.1    1.0   Protime 9.8 - 12.8 seconds   11.1   WBC 4.4 - 11.3 x10*3/uL 8.9     nRBC 0.0 - 0.0 /100 WBCs 0.0     RBC 4.00 - 5.20 x10*6/uL 4.71     HEMOGLOBIN 12.0 - 16.0 g/dL 14.4     HEMATOCRIT 36.0 - 46.0 % 44.4     MCV 80 - 100 fL 94     MCH 26.0 - 34.0 pg 30.6     MCHC 32.0 - 36.0 g/dL 32.4     RED CELL DISTRIBUTION WIDTH 11.5 - 14.5 % 12.5     Platelets 150 - 450 x10*3/uL 122 (L)     Neutrophils % 40.0 - 80.0 % 63.8     Immature Granulocytes %, Automated 0.0 - 0.9 % 0.6     Lymphocytes % 13.0 - 44.0 % 21.6     Monocytes % 2.0 - 10.0 % 4.9     Eosinophils % 0.0 - 6.0 % 8.4     Basophils % 0.0 - 2.0 % 0.7     Neutrophils Absolute 1.60 - 5.50 x10*3/uL 5.71 (H)     Immature Granulocytes Absolute, Automated 0.00 - 0.50 x10*3/uL 0.05     Lymphocytes Absolute 0.80 - 3.00 x10*3/uL 1.93     Monocytes Absolute 0.05 - 0.80 x10*3/uL 0.44     Eosinophils Absolute 0.00 - 0.40 x10*3/uL 0.75 (H)     Basophils Absolute 0.00 - 0.10 x10*3/uL 0.06     Flu A Result Not Detected   Not Detected    Flu B Result Not Detected   Not Detected    Coronavirus 2019, PCR Not Detected   Not Detected    (H): Data is " abnormally high  (L): Data is abnormally low    XR CHEST:  FINDINGS:  The heart and mediastinum are normal.  The lungs are without  infiltrates, masses or pulmonary vascular congestion.  No pneumothorax  or pleural effusion are seen.  No acute osseous changes.     Assessment/Plan   Principal Problem:    COPD exacerbation (CMS/HCC)  Hypokalemia      PLAN:  As per admission orders      I spent 60 minutes in the professional and overall care of this patient.      Ally Pearson MD

## 2023-11-05 NOTE — PROGRESS NOTES
11/05/23 0836   Discharge Planning   Living Arrangements Family members  (patient's ex daughter in law stays with her most of the time)   Support Systems Family members   Assistance Needed A&Ox3, independent, drives   Type of Residence Private residence   Home or Post Acute Services None   Patient expects to be discharged to: Home, may need home oxygen   Does the patient need discharge transport arranged? No

## 2023-11-05 NOTE — PROGRESS NOTES
Margaux Harris is a 78 y.o. female presenting with COPD exacerbation (CMS/[facility]).      Subjective   Patient reports breathing is improving. Able to walk to the bathroom and back without issue. Still audibly wheezing with mild conversational dyspnea.        Objective     Last Recorded Vitals  /59 (BP Location: Right arm, Patient Position: Sitting)   Pulse 58   Temp 36.8 °C (98.2 °F) (Temporal)   Resp 16   Wt 94.3 kg (207 lb 14.3 oz)   SpO2 96%   Intake/Output last 3 Shifts:  No intake or output data in the 24 hours ending 11/06/23 0802    Admission Weight  Weight: 94.3 kg (208 lb) (11/04/23 2155)    Daily Weight  11/05/23 : 94.3 kg (207 lb 14.3 oz)    Image Results  XR chest 1 view  Narrative: INDICATION:  Shortness of breath.  COMPARISON:  None available  TECHNIQUE: AP chest 22:41 hours (two images).  FINDINGS:  The heart and mediastinum are normal.  The lungs are without  infiltrates, masses or pulmonary vascular congestion.  No pneumothorax  or pleural effusion are seen.  No acute osseous changes.  Impression: Normal AP chest.  Signed by Ernesto Cho MD      Physical Exam  Vitals and nursing note reviewed.   Constitutional:       General: She is not in acute distress.     Appearance: She is obese. She is ill-appearing.   HENT:      Head: Normocephalic and atraumatic.      Right Ear: External ear normal.      Left Ear: External ear normal.      Nose: Nose normal. No rhinorrhea.      Mouth/Throat:      Mouth: Mucous membranes are moist.      Pharynx: Oropharynx is clear. No oropharyngeal exudate.   Eyes:      General: No scleral icterus.        Right eye: No discharge.         Left eye: No discharge.      Conjunctiva/sclera: Conjunctivae normal.   Cardiovascular:      Rate and Rhythm: Normal rate and regular rhythm.      Pulses: Normal pulses.      Heart sounds: Normal heart sounds. No murmur heard.  Pulmonary:      Effort: Pulmonary effort is normal. No respiratory distress.      Breath sounds: Wheezing  present. No rales.   Abdominal:      General: Abdomen is flat. There is no distension.      Palpations: Abdomen is soft.      Tenderness: There is no abdominal tenderness.   Musculoskeletal:         General: No tenderness.      Right lower leg: No edema.      Left lower leg: No edema.   Skin:     General: Skin is warm and dry.      Capillary Refill: Capillary refill takes less than 2 seconds.      Findings: No rash.   Neurological:      General: No focal deficit present.      Mental Status: She is alert and oriented to person, place, and time. Mental status is at baseline.   Psychiatric:         Mood and Affect: Mood normal.         Behavior: Behavior normal.         Thought Content: Thought content normal.         Judgment: Judgment normal.     Relevant Results             Scheduled medications  budesonide, 0.5 mg, nebulization, BID  citalopram, 20 mg, oral, Daily  doxycycline, 100 mg, oral, q12h ADA  guaiFENesin, 1,200 mg, oral, BID  ipratropium-albuteroL, 3 mL, nebulization, TID  levothyroxine, 112 mcg, oral, Daily before breakfast  loratadine, 10 mg, oral, Daily  methylPREDNISolone sodium succinate (PF), 40 mg, intravenous, q6h  montelukast, 10 mg, oral, Nightly  multivitamin with minerals, 1 tablet, oral, Daily  potassium chloride CR, 40 mEq, oral, Once      Continuous medications     PRN medications  PRN medications: albuterol, oxygen       Results for orders placed or performed during the hospital encounter of 11/04/23 (from the past 24 hour(s))   CBC and Auto Differential   Result Value Ref Range    WBC 5.7 4.4 - 11.3 x10*3/uL    nRBC 0.0 0.0 - 0.0 /100 WBCs    RBC 4.46 4.00 - 5.20 x10*6/uL    Hemoglobin 13.5 12.0 - 16.0 g/dL    Hematocrit 42.4 36.0 - 46.0 %    MCV 95 80 - 100 fL    MCH 30.3 26.0 - 34.0 pg    MCHC 31.8 (L) 32.0 - 36.0 g/dL    RDW 12.5 11.5 - 14.5 %    Platelets 122 (L) 150 - 450 x10*3/uL    Neutrophils % 85.4 40.0 - 80.0 %    Immature Granulocytes %, Automated 0.7 0.0 - 0.9 %    Lymphocytes  % 12.9 13.0 - 44.0 %    Monocytes % 1.0 2.0 - 10.0 %    Eosinophils % 0.0 0.0 - 6.0 %    Basophils % 0.0 0.0 - 2.0 %    Neutrophils Absolute 4.89 1.60 - 5.50 x10*3/uL    Immature Granulocytes Absolute, Automated 0.04 0.00 - 0.50 x10*3/uL    Lymphocytes Absolute 0.74 (L) 0.80 - 3.00 x10*3/uL    Monocytes Absolute 0.06 0.05 - 0.80 x10*3/uL    Eosinophils Absolute 0.00 0.00 - 0.40 x10*3/uL    Basophils Absolute 0.00 0.00 - 0.10 x10*3/uL   Comprehensive Metabolic Panel   Result Value Ref Range    Glucose 134 (H) 74 - 99 mg/dL    Sodium 139 136 - 145 mmol/L    Potassium 4.2 3.5 - 5.3 mmol/L    Chloride 105 98 - 107 mmol/L    Bicarbonate 24 21 - 32 mmol/L    Anion Gap 14 10 - 20 mmol/L    Urea Nitrogen 13 6 - 23 mg/dL    Creatinine 0.83 0.50 - 1.05 mg/dL    eGFR 71 >60 mL/min/1.73m*2    Calcium 9.0 8.6 - 10.3 mg/dL    Albumin 4.3 3.4 - 5.0 g/dL    Alkaline Phosphatase 82 33 - 136 U/L    Total Protein 7.4 6.4 - 8.2 g/dL    AST 18 9 - 39 U/L    Bilirubin, Total 0.5 0.0 - 1.2 mg/dL    ALT 19 7 - 45 U/L   Magnesium   Result Value Ref Range    Magnesium 1.99 1.60 - 2.40 mg/dL   Folate   Result Value Ref Range    Folate, Serum >24.0 >5.0 ng/mL   Comprehensive Metabolic Panel   Result Value Ref Range    Glucose 132 (H) 74 - 99 mg/dL    Sodium 138 136 - 145 mmol/L    Potassium 4.4 3.5 - 5.3 mmol/L    Chloride 107 98 - 107 mmol/L    Bicarbonate 24 21 - 32 mmol/L    Anion Gap 11 10 - 20 mmol/L    Urea Nitrogen 21 6 - 23 mg/dL    Creatinine 0.75 0.50 - 1.05 mg/dL    eGFR 81 >60 mL/min/1.73m*2    Calcium 8.8 8.6 - 10.3 mg/dL    Albumin 3.7 3.4 - 5.0 g/dL    Alkaline Phosphatase 70 33 - 136 U/L    Total Protein 6.5 6.4 - 8.2 g/dL    AST 21 9 - 39 U/L    Bilirubin, Total 0.4 0.0 - 1.2 mg/dL    ALT 18 7 - 45 U/L   Magnesium   Result Value Ref Range    Magnesium 2.16 1.60 - 2.40 mg/dL             Assessment/Plan        Principal Problem:    COPD exacerbation (CMS/HCC)  Active Problems:    Thrombocytopenia (CMS/HCC)    Hypokalemia     Hypothyroidism    COPD Exacerbation  -Receiving Solu-Medrol 40 mg IV every 8 hours currently. Plan to continue for now given symptomatic improvement but continued diffuse wheezing.  -Continue 3 times daily DuoNebs and twice daily budesonide nebs  -Continue doxycycline 100 mg twice daily (day #2)  -Continue to wean treatment as tolerated  -Continue other home meds addressing allergies including nonsedating antihistamine, montelukast, and guaifenesin.     Hypokalemia  -Recheck potassium level today is 4.4 and magnesium level is 2.16.  -Plan to recheck in a.m. tomorrow with current treatment.      Depression/anxiety  -Continue home citalopram     Hypothyroidism  -Continue home levothyroxine. TSH with reflex is at goal.      Thrombocytopenia  -Presenting platelet count was 122, mildly decreased.  Only comparison in system is from 2019 where it was 130.  This suggests it is a chronic issue.  Continue to monitor during hospitalization.  -Normal vitamin B12 and folate levels.  -Repeat platelet count this morning is 98. Continue to monitor.     Disposition  Pending clinical improvement from COPD Exacerbation. Likely 1-2 more days of hospitalization.             Marcelo Alcantar MD

## 2023-11-05 NOTE — H&P
History Of Present Illness  Margaux Harris is a 78 y.o. female with a history of arthritis, COPD, hypothyroidism who presented to the emergency department late on November 4 complaining of shortness of breath and wheezing.  She was brought in via EMS who gave her 2 DuoNeb's, 125 mg of IV Solu-Medrol, and 25 mg of IV Benadryl.  She noted a history of COPD but was between doctors and had logistic issues getting her medications filled.  She reported being out of her regular COPD medications for almost a month.  She also gets allergy shots but has not been able to get those for some time due to these changes.  She noted her current shortness of breath and wheezing started 2 days prior to presentation.  By the time of arrival to the ED, she reported feeling much better.  She denied chest pain, palpitations, cough, fevers, or vomiting.  Despite feeling better, ED notes reports she was still having diffuse bilateral wheezing.  She denied sick contacts.    Laboratory evaluation in the emergency department was notable for potassium 3.2, glucose 103, and platelets 122.  Creatinine, LFTs, magnesium, troponin, INR, white blood cell count, and hemoglobin were unremarkable.  COVID and influenza testing was negative.  Portable chest x-ray was obtained and was unremarkable.  Therapeutic interventions in the emergency department included DuoNeb and albuterol neb x1 along with Solu-Medrol 40 mg IV every 8 hours.  She was admitted for further evaluation and treatment.     Past Medical History  She has a past medical history of Arthritis, COPD (chronic obstructive pulmonary disease) (CMS/[facility]), Disease of thyroid gland, and Personal history of other diseases of the respiratory system (01/13/2015).    Surgical History  She has a past surgical history that includes Cholecystectomy (09/16/2013); Other surgical history (09/20/2013); Appendectomy (09/20/2013); Incision and drainage of wound (09/20/2013); Dilation and curettage of uterus  (09/20/2013); and Tonsillectomy (09/20/2013).     Social History  She reports that she quit smoking about 31 years ago. Her smoking use included cigarettes. She does not have any smokeless tobacco history on file. She reports that she does not currently use alcohol. She reports that she does not currently use drugs.    Family History  Family History   Problem Relation Name Age of Onset    Heart failure Mother          Allergies  Patient has no known allergies.    Review of Systems   Constitutional:  Negative for chills and fever.   HENT:  Negative for postnasal drip and sore throat.    Eyes:  Negative for pain and visual disturbance.   Respiratory:  Positive for shortness of breath and wheezing. Negative for cough.    Cardiovascular:  Negative for chest pain, palpitations and leg swelling.   Gastrointestinal:  Negative for abdominal pain, diarrhea, nausea and vomiting.   Genitourinary:  Negative for dysuria and hematuria.   Musculoskeletal:  Negative for arthralgias and back pain.   Skin:  Negative for rash and wound.   Neurological:  Negative for dizziness and headaches.   Psychiatric/Behavioral:  Negative for dysphoric mood. The patient is not nervous/anxious.         Physical Exam  Vitals and nursing note reviewed.   Constitutional:       General: She is not in acute distress.     Appearance: She is obese. She is ill-appearing.   HENT:      Head: Normocephalic and atraumatic.      Right Ear: External ear normal.      Left Ear: External ear normal.      Nose: Nose normal. No rhinorrhea.      Mouth/Throat:      Mouth: Mucous membranes are moist.      Pharynx: Oropharynx is clear. No oropharyngeal exudate.   Eyes:      General: No scleral icterus.        Right eye: No discharge.         Left eye: No discharge.      Conjunctiva/sclera: Conjunctivae normal.   Cardiovascular:      Rate and Rhythm: Normal rate and regular rhythm.      Pulses: Normal pulses.      Heart sounds: Normal heart sounds. No murmur  heard.  Pulmonary:      Effort: Pulmonary effort is normal. No respiratory distress.      Breath sounds: Wheezing present. No rales.   Abdominal:      General: Abdomen is flat. There is no distension.      Palpations: Abdomen is soft.      Tenderness: There is no abdominal tenderness.   Musculoskeletal:         General: No tenderness.      Right lower leg: No edema.      Left lower leg: No edema.   Skin:     General: Skin is warm and dry.      Capillary Refill: Capillary refill takes less than 2 seconds.      Findings: No rash.   Neurological:      General: No focal deficit present.      Mental Status: She is alert and oriented to person, place, and time. Mental status is at baseline.   Psychiatric:         Mood and Affect: Mood normal.         Behavior: Behavior normal.         Thought Content: Thought content normal.         Judgment: Judgment normal.          Last Recorded Vitals  /63   Pulse 79   Temp 36.6 °C (97.9 °F) (Temporal)   Resp 18   Wt 94.3 kg (207 lb 14.3 oz)   SpO2 96%     Relevant Results        Results for orders placed or performed during the hospital encounter of 11/04/23 (from the past 24 hour(s))   Light Blue Top   Result Value Ref Range    Extra Tube Hold for add-ons.    PST Top   Result Value Ref Range    Extra Tube Hold for add-ons.    Lavender Top   Result Value Ref Range    Extra Tube Hold for add-ons.    SST TOP   Result Value Ref Range    Extra Tube Hold for add-ons.    CBC and Auto Differential   Result Value Ref Range    WBC 8.9 4.4 - 11.3 x10*3/uL    nRBC 0.0 0.0 - 0.0 /100 WBCs    RBC 4.71 4.00 - 5.20 x10*6/uL    Hemoglobin 14.4 12.0 - 16.0 g/dL    Hematocrit 44.4 36.0 - 46.0 %    MCV 94 80 - 100 fL    MCH 30.6 26.0 - 34.0 pg    MCHC 32.4 32.0 - 36.0 g/dL    RDW 12.5 11.5 - 14.5 %    Platelets 122 (L) 150 - 450 x10*3/uL    Neutrophils % 63.8 40.0 - 80.0 %    Immature Granulocytes %, Automated 0.6 0.0 - 0.9 %    Lymphocytes % 21.6 13.0 - 44.0 %    Monocytes % 4.9 2.0 - 10.0  %    Eosinophils % 8.4 0.0 - 6.0 %    Basophils % 0.7 0.0 - 2.0 %    Neutrophils Absolute 5.71 (H) 1.60 - 5.50 x10*3/uL    Immature Granulocytes Absolute, Automated 0.05 0.00 - 0.50 x10*3/uL    Lymphocytes Absolute 1.93 0.80 - 3.00 x10*3/uL    Monocytes Absolute 0.44 0.05 - 0.80 x10*3/uL    Eosinophils Absolute 0.75 (H) 0.00 - 0.40 x10*3/uL    Basophils Absolute 0.06 0.00 - 0.10 x10*3/uL   Influenza A, and B PCR   Result Value Ref Range    Flu A Result Not Detected Not Detected    Flu B Result Not Detected Not Detected   Sars-CoV-2 PCR, Symptomatic   Result Value Ref Range    Coronavirus 2019, PCR Not Detected Not Detected   Comprehensive metabolic panel   Result Value Ref Range    Glucose 103 (H) 74 - 99 mg/dL    Sodium 139 136 - 145 mmol/L    Potassium 3.2 (L) 3.5 - 5.3 mmol/L    Chloride 105 98 - 107 mmol/L    Bicarbonate 26 21 - 32 mmol/L    Anion Gap 11 10 - 20 mmol/L    Urea Nitrogen 13 6 - 23 mg/dL    Creatinine 0.86 0.50 - 1.05 mg/dL    eGFR 68 >60 mL/min/1.73m*2    Calcium 8.6 8.6 - 10.3 mg/dL    Albumin 3.9 3.4 - 5.0 g/dL    Alkaline Phosphatase 78 33 - 136 U/L    Total Protein 6.7 6.4 - 8.2 g/dL    AST 18 9 - 39 U/L    Bilirubin, Total 0.5 0.0 - 1.2 mg/dL    ALT 18 7 - 45 U/L   Magnesium   Result Value Ref Range    Magnesium 1.93 1.60 - 2.40 mg/dL   Protime-INR   Result Value Ref Range    Protime 11.1 9.8 - 12.8 seconds    INR 1.0 0.9 - 1.1   Troponin I, High Sensitivity   Result Value Ref Range    Troponin I, High Sensitivity 10 0 - 13 ng/L   B-Type Natriuretic Peptide   Result Value Ref Range    BNP 37 0 - 99 pg/mL          Assessment/Plan   Principal Problem:    COPD exacerbation (CMS/HCC)  Active Problems:    Thrombocytopenia (CMS/HCC)    Hypokalemia    Hypothyroidism      COPD Exacerbation  -Continue Solu-Medrol 40 mg IV every 8 hours as diffuse wheezing persists  -Continue 3 times daily DuoNebs and add twice daily budesonide nebs  -Add doxycycline 100 mg twice daily  -Wean treatment as  tolerated  -Continue other home meds addressing allergies including nonsedating antihistamine, montelukast, and guaifenesin    Hypokalemia  -Recheck potassium and magnesium levels this morning and correct as appropriate  -Continue to follow daily    Depression/anxiety  -Continue home citalopram    Hypothyroidism  -Continue home levothyroxine and check TSH with reflex    Thrombocytopenia  -Presenting platelet count was 122, mildly decreased.  Only comparison in system is from 2019 where it was 130.  This suggests it is a chronic issue.  Continue to monitor during hospitalization.  -Check vitamin B12 and folate levels.       Marcelo Alcantar MD

## 2023-11-05 NOTE — ED PROVIDER NOTES
HPI   Chief Complaint   Patient presents with   • Shortness of Breath     Patient with history of COPD presents with SOB. EMS gave 2 duonebs, 125mg solumedrol, and 25 IV benadryl for her seasonal allergies. EMS stated patient was  wheezing upon arrival and on 5L oxygen NC (family oxygen machine, not hers, normally on room air). Patient now on room air after breathing treatment and reports feeling much better.       This is a 78-year-old female with PMH COPD presenting for evaluation of wheezing, shortness of breath.  States she only this past week got her regular COPD medications refilled.  In the last couple of days had worsening exertional dyspnea and a couple of episodes of suddenly feeling cold all over but this suddenly passed.  Found to be very wheezy and hypoxic to mid 80s on EMS arrival and was placed on supplemental oxygen was given Solu-Medrol and 2 DuoNeb's.  Feels remarkably better.  Denies cough, fevers, chest pain, vomiting.                          [place] Coma Scale Score: 15                  Patient History   Past Medical History:   Diagnosis Date   • Personal history of other diseases of the respiratory system 01/13/2015    History of acute bronchitis     Past Surgical History:   Procedure Laterality Date   • APPENDECTOMY  09/20/2013    Appendectomy   • CHOLECYSTECTOMY  09/16/2013    Cholecystectomy Laparoscopic   • DILATION AND CURETTAGE OF UTERUS  09/20/2013    Dilation And Curettage Of Cervical Stump   • INCISION AND DRAINAGE OF WOUND  09/20/2013    Incision And Drainage Of Skin Abscess   • OTHER SURGICAL HISTORY  09/20/2013    Ovarian Cystectomy   • TONSILLECTOMY  09/20/2013    Tonsillectomy     No family history on file.  Social History     Tobacco Use   • Smoking status: Not on file   • Smokeless tobacco: Not on file   Substance Use Topics   • Alcohol use: Not on file   • Drug use: Not on file       Physical Exam   ED Triage Vitals [11/04/23 2155]   Temp Heart Rate Resp BP   36.8 °C (98.2 °F)  88 20 (!) 154/101      SpO2 Temp [facility] Heart Rate Source Patient Position   96 % -- Monitor Sitting      BP Location FiO2 (%)     Right arm --       Physical Exam    General: Vitals noted, no distress  Neck: Trachea midline.  No JVD appreciated.  Cardiac: Regular rate and rhythm. No murmur  Pulmonary: Diffuse expiratory wheezing bilaterally  Abdomen: Soft. Nontender  Extremities: No peripheral edema  Skin: No rash  Neuro: No focal neurologic deficits    ED Course & MDM   Diagnoses as of 11/05/23 0018   COPD exacerbation (CMS/[facility])       Medical Decision Making  DDx: PE, ACS, abelardo-/myocarditis, pneumothorax, pneumonia, pulmonary edema, bronchospasm, dysrhythmia  EKG Interpreted by me: NSR.  Rate 85.  Normal axis.  QTc 456 ms.  No acute T-wave changes. No STEMI.    78-year-old female with PMH COPD presenting for evaluation of wheezing, shortness of breath.  Is not hypoxic on room air, saturating in the mid 90s appropriately.  Remainder of vitals unremarkable.  Reports feeling remarkably improved.  Still having diffuse wheezing bilaterally.  Was given additional DuoNeb and albuterol.  Chest x-ray shows no acute cardiopulmonary process as read by the radiologist.  Lab work reassuring.  After being treated the patient is still having diffuse wheezing although is not tachypneic.  Patient would benefit from hospitalization for scheduled bronchodilators and further care.  Will discuss with the hospitalist team.  This visit was staffed with the attending physician Dr. Hurd.      Disclaimer: This note was dictated using speech recognition software. An attempt at proofreading was made to minimize errors. Minor errors in transcription may be present. Please call if questions.        Procedure  Procedures     Cristobal Harrison PA-C  11/05/23 0019

## 2023-11-06 LAB
ALBUMIN SERPL BCP-MCNC: 3.7 G/DL (ref 3.4–5)
ALP SERPL-CCNC: 70 U/L (ref 33–136)
ALT SERPL W P-5'-P-CCNC: 18 U/L (ref 7–45)
ANION GAP SERPL CALC-SCNC: 11 MMOL/L (ref 10–20)
AST SERPL W P-5'-P-CCNC: 21 U/L (ref 9–39)
BASOPHILS # BLD AUTO: 0.01 X10*3/UL (ref 0–0.1)
BASOPHILS NFR BLD AUTO: 0.1 %
BILIRUB SERPL-MCNC: 0.4 MG/DL (ref 0–1.2)
BUN SERPL-MCNC: 21 MG/DL (ref 6–23)
CALCIUM SERPL-MCNC: 8.8 MG/DL (ref 8.6–10.3)
CHLORIDE SERPL-SCNC: 107 MMOL/L (ref 98–107)
CO2 SERPL-SCNC: 24 MMOL/L (ref 21–32)
CREAT SERPL-MCNC: 0.75 MG/DL (ref 0.5–1.05)
EOSINOPHIL # BLD AUTO: 0 X10*3/UL (ref 0–0.4)
EOSINOPHIL NFR BLD AUTO: 0 %
ERYTHROCYTE [DISTWIDTH] IN BLOOD BY AUTOMATED COUNT: 12.7 % (ref 11.5–14.5)
GFR SERPL CREATININE-BSD FRML MDRD: 81 ML/MIN/1.73M*2
GLUCOSE SERPL-MCNC: 132 MG/DL (ref 74–99)
HCT VFR BLD AUTO: 39.2 % (ref 36–46)
HGB BLD-MCNC: 12.4 G/DL (ref 12–16)
IMM GRANULOCYTES # BLD AUTO: 0.08 X10*3/UL (ref 0–0.5)
IMM GRANULOCYTES NFR BLD AUTO: 0.8 % (ref 0–0.9)
LYMPHOCYTES # BLD AUTO: 0.79 X10*3/UL (ref 0.8–3)
LYMPHOCYTES NFR BLD AUTO: 8.2 %
MAGNESIUM SERPL-MCNC: 2.16 MG/DL (ref 1.6–2.4)
MCH RBC QN AUTO: 30.2 PG (ref 26–34)
MCHC RBC AUTO-ENTMCNC: 31.6 G/DL (ref 32–36)
MCV RBC AUTO: 95 FL (ref 80–100)
MONOCYTES # BLD AUTO: 0.22 X10*3/UL (ref 0.05–0.8)
MONOCYTES NFR BLD AUTO: 2.3 %
NEUTROPHILS # BLD AUTO: 8.48 X10*3/UL (ref 1.6–5.5)
NEUTROPHILS NFR BLD AUTO: 88.6 %
NRBC BLD-RTO: 0 /100 WBCS (ref 0–0)
OVALOCYTES BLD QL SMEAR: NORMAL
PLATELET # BLD AUTO: 98 X10*3/UL (ref 150–450)
PLATELET CLUMP BLD QL SMEAR: PRESENT
POLYCHROMASIA BLD QL SMEAR: NORMAL
POTASSIUM SERPL-SCNC: 4.4 MMOL/L (ref 3.5–5.3)
PROT SERPL-MCNC: 6.5 G/DL (ref 6.4–8.2)
RBC # BLD AUTO: 4.11 X10*6/UL (ref 4–5.2)
RBC MORPH BLD: NORMAL
SODIUM SERPL-SCNC: 138 MMOL/L (ref 136–145)
WBC # BLD AUTO: 9.6 X10*3/UL (ref 4.4–11.3)

## 2023-11-06 PROCEDURE — 80053 COMPREHEN METABOLIC PANEL: CPT | Performed by: INTERNAL MEDICINE

## 2023-11-06 PROCEDURE — 2500000005 HC RX 250 GENERAL PHARMACY W/O HCPCS: Performed by: INTERNAL MEDICINE

## 2023-11-06 PROCEDURE — 94668 MNPJ CHEST WALL SBSQ: CPT

## 2023-11-06 PROCEDURE — 2500000004 HC RX 250 GENERAL PHARMACY W/ HCPCS (ALT 636 FOR OP/ED): Performed by: INTERNAL MEDICINE

## 2023-11-06 PROCEDURE — 2500000002 HC RX 250 W HCPCS SELF ADMINISTERED DRUGS (ALT 637 FOR MEDICARE OP, ALT 636 FOR OP/ED): Performed by: INTERNAL MEDICINE

## 2023-11-06 PROCEDURE — 85025 COMPLETE CBC W/AUTO DIFF WBC: CPT | Performed by: INTERNAL MEDICINE

## 2023-11-06 PROCEDURE — 83735 ASSAY OF MAGNESIUM: CPT | Performed by: INTERNAL MEDICINE

## 2023-11-06 PROCEDURE — 94640 AIRWAY INHALATION TREATMENT: CPT

## 2023-11-06 PROCEDURE — 36415 COLL VENOUS BLD VENIPUNCTURE: CPT | Performed by: INTERNAL MEDICINE

## 2023-11-06 PROCEDURE — 1100000001 HC PRIVATE ROOM DAILY

## 2023-11-06 PROCEDURE — 97161 PT EVAL LOW COMPLEX 20 MIN: CPT | Mod: GP

## 2023-11-06 PROCEDURE — 2500000001 HC RX 250 WO HCPCS SELF ADMINISTERED DRUGS (ALT 637 FOR MEDICARE OP): Performed by: INTERNAL MEDICINE

## 2023-11-06 PROCEDURE — 99232 SBSQ HOSP IP/OBS MODERATE 35: CPT | Performed by: INTERNAL MEDICINE

## 2023-11-06 RX ADMIN — Medication 1 L/MIN: at 07:51

## 2023-11-06 RX ADMIN — GUAIFENESIN 1200 MG: 600 TABLET ORAL at 20:06

## 2023-11-06 RX ADMIN — IPRATROPIUM BROMIDE AND ALBUTEROL SULFATE 3 ML: 2.5; .5 SOLUTION RESPIRATORY (INHALATION) at 07:50

## 2023-11-06 RX ADMIN — GUAIFENESIN 1200 MG: 600 TABLET ORAL at 08:01

## 2023-11-06 RX ADMIN — METHYLPREDNISOLONE SODIUM SUCCINATE 40 MG: 40 INJECTION, POWDER, FOR SOLUTION INTRAMUSCULAR; INTRAVENOUS at 08:01

## 2023-11-06 RX ADMIN — IPRATROPIUM BROMIDE AND ALBUTEROL SULFATE 3 ML: 2.5; .5 SOLUTION RESPIRATORY (INHALATION) at 19:26

## 2023-11-06 RX ADMIN — METHYLPREDNISOLONE SODIUM SUCCINATE 40 MG: 40 INJECTION, POWDER, FOR SOLUTION INTRAMUSCULAR; INTRAVENOUS at 20:06

## 2023-11-06 RX ADMIN — LEVOTHYROXINE SODIUM 112 MCG: 112 TABLET ORAL at 06:10

## 2023-11-06 RX ADMIN — DOXYCYCLINE HYCLATE 100 MG: 100 TABLET, COATED ORAL at 20:06

## 2023-11-06 RX ADMIN — METHYLPREDNISOLONE SODIUM SUCCINATE 40 MG: 40 INJECTION, POWDER, FOR SOLUTION INTRAMUSCULAR; INTRAVENOUS at 14:07

## 2023-11-06 RX ADMIN — LORATADINE 10 MG: 10 TABLET ORAL at 08:01

## 2023-11-06 RX ADMIN — BUDESONIDE 0.5 MG: 0.5 INHALANT RESPIRATORY (INHALATION) at 19:26

## 2023-11-06 RX ADMIN — IPRATROPIUM BROMIDE AND ALBUTEROL SULFATE 3 ML: 2.5; .5 SOLUTION RESPIRATORY (INHALATION) at 14:10

## 2023-11-06 RX ADMIN — METHYLPREDNISOLONE SODIUM SUCCINATE 40 MG: 40 INJECTION, POWDER, FOR SOLUTION INTRAMUSCULAR; INTRAVENOUS at 01:51

## 2023-11-06 RX ADMIN — MONTELUKAST 10 MG: 10 TABLET, FILM COATED ORAL at 20:06

## 2023-11-06 RX ADMIN — BUDESONIDE 0.5 MG: 0.5 INHALANT RESPIRATORY (INHALATION) at 07:50

## 2023-11-06 RX ADMIN — DOXYCYCLINE HYCLATE 100 MG: 100 TABLET, COATED ORAL at 08:01

## 2023-11-06 RX ADMIN — MULTIPLE VITAMINS W/ MINERALS TAB 1 TABLET: TAB at 08:01

## 2023-11-06 RX ADMIN — CITALOPRAM HYDROBROMIDE 20 MG: 20 TABLET ORAL at 08:01

## 2023-11-06 ASSESSMENT — COGNITIVE AND FUNCTIONAL STATUS - GENERAL
MOBILITY SCORE: 24
MOBILITY SCORE: 24
DAILY ACTIVITIY SCORE: 24
DAILY ACTIVITIY SCORE: 24
MOBILITY SCORE: 24

## 2023-11-06 ASSESSMENT — PAIN - FUNCTIONAL ASSESSMENT
PAIN_FUNCTIONAL_ASSESSMENT: 0-10
PAIN_FUNCTIONAL_ASSESSMENT: 0-10

## 2023-11-06 ASSESSMENT — PAIN SCALES - GENERAL
PAINLEVEL_OUTOF10: 0 - NO PAIN

## 2023-11-06 NOTE — PROGRESS NOTES
11/06/23 0904   Discharge Planning   Living Arrangements Family members  (patient's ex daughter in law stays with her most of the time)   Support Systems Family members   Assistance Needed A&Ox3, independent, drives   Type of Residence Private residence   Home or Post Acute Services None   Patient expects to be discharged to: Home, may need home oxygen

## 2023-11-06 NOTE — PROGRESS NOTES
Physical Therapy    Physical Therapy Evaluation    Patient Name: Margaux Harris  MRN: [id number]  Today's Date: 11/6/2023   Time Calculation  Start Time: 1235  Stop Time: 1252  Time Calculation (min): 17 min    Assessment/Plan   PT Assessment  PT Assessment Results: Decreased endurance (SOB with ambulation/basic mobility)  Rehab Prognosis: Excellent  Evaluation/Treatment Tolerance: Patient tolerated treatment well  Medical Staff Made Aware: Yes  Strengths: Ability to acquire knowledge, Attitude of self  End of Session Communication: Bedside nurse  End of Session Patient Position: Bed, 2 rail up, Alarm off, not on at start of session  IP OR SWING BED PT PLAN  Inpatient or Swing Bed: Inpatient  PT Plan  Treatment/Interventions:  (Instructed pt. in diaphragmatic breathing exercises and progression of exercise.  Practiced diaphragmatic breathing x5 min with verbal and demonstration cues.  Pt. has difficulty actiivating diaphragm - tendency to engage accessory muscles)  PT Plan: PT Eval only  PT Eval Only Reason: At baseline function (with mild SOB with mobility which is improving - pt. educated in diaphragmatic breathing exercises and progresion of mobility to prepare for home-going.  pt. verbalized understanding.)  PT Discharge Recommendations: No further acute PT, No PT needed after discharge (Continue with mobility progression and diaphragmatic breathing exercises.)  PT - OK to Discharge: Yes      Subjective   General Visit Information:  General  Reason for Referral: 77 yo admit with progressive SOB, COPD exacerbation  Past Medical History Relevant to Rehab: COPD, no home O2  Family/Caregiver Present: No  Prior to Session Communication: Bedside nurse  Patient Position Received: Bed, 2 rail up, Alarm off, not on at start of session  Preferred Learning Style: verbal, auditory  General Comment: Pt. pleasant, cooperative, agreeable to therapy.  On room air with 1L O2 by NC available as needed.  David norris  but not  working  Home Living:  Home Living  Type of Home: House  Lives With:  (ex dtr in law)  Home Adaptive Equipment: None  Home Layout: Multi-level  Home Access: Stairs to enter with rails  Entrance Stairs-Rails: Both  Entrance Stairs-Number of Steps: 6  Home Living Comments: 6 GIL with B rails to access bed/bath/living area  Prior Level of Function:  Prior Function Per Pt/Caregiver Report  Level of [place]: Independent with ADLs and functional transfers  Ambulatory Assistance: Independent (no device)  Precautions:  Precautions  Medical Precautions: Oxygen therapy device and L/min (1L O2 as needed)    Objective   Pain:  Pain Assessment  Pain Assessment: 0-10  Pain Score: 0 - No pain  Cognition:  Cognition  Overall Cognitive Status: Within Functional Limits    General Assessments:        Activity Tolerance  Endurance:  (Tolerates 10 min activity with MILD SOB reported)  Early Mobility/Exercise Safety Screen: Proceed with mobilization - No exclusion criteria met  Rate of Perceived Dyspnea (RPD):  (mild SOB)     Functional Assessments:  Bed Mobility  Bed Mobility: Yes (Supine/Sit: Independent)    Transfers  Transfer: Yes (Sit/stand INDEPENDENT)    Ambulation/Gait Training  Ambulation/Gait Training Performed: Yes (Ambulates in room and hallway IND without device >100 ft, steady, safe, on room air; however reports mild SOB.)  Extremity/Trunk Assessments:  RLE   RLE : Within Functional Limits  LLE   LLE : Within Functional Limits  Outcome Measures:  [facility] Basic Mobility  Turning from your back to your side while in a flat bed without using bedrails: None  Moving from lying on your back to sitting on the side of a flat bed without using bedrails: None  Moving to and from bed to chair (including a wheelchair): None  Standing up from a chair using your arms (e.g. wheelchair or bedside chair): None  To walk in hospital room: None  Climbing 3-5 steps with railing: None  Basic Mobility - Total Score: 24        Education  Documentation  Home Exercise Program, taught by Saundra Sears, PT at 11/6/2023  1:11 PM.  Learner: Patient  Readiness: Eager  Method: Explanation, Demonstration, Teach-back  Response: Verbalizes Understanding    Education Comments  No comments found.    Instructed pt. In diaphragmatic breathing exercises and progression of mobility on unit.  Pt. Verbalized understanding.

## 2023-11-06 NOTE — PROGRESS NOTES
Occupational Therapy    Screen/ Discontinue    Patient Name: Margaux Harris  MRN: [id number]  Today's Date: 11/6/2023   Time: 10:41    Assessment  IP OT Assessment  End of Session Communication: Bedside nurse  End of Session Patient Position: Bed, 3 rail up  Plan:  No Skilled OT: At baseline function    Subjective   General: Pt reports recently ambulating I order to complete home O2 assessment. No mobility concerns identified. Pt states she has been completing all BADL independently and declines OT evaluation and services at this time.   Pt screened for OT needs and consult discontinued.

## 2023-11-06 NOTE — CARE PLAN
The patient's goals for the shift include pt will keep spo2 above 92% on room air    The clinical goals for the shift include progressing      Problem: Respiratory  Goal: Clear secretions with interventions this shift  Outcome: Progressing  Goal: Minimize anxiety/maximize coping throughout shift  Outcome: Progressing  Goal: Minimal/no exertional discomfort or dyspnea this shift  Outcome: Progressing  Goal: No signs of respiratory distress (eg. Use of accessory muscles. Peds grunting)  Outcome: Progressing  Goal: Patent airway maintained this shift  Outcome: Progressing  Goal: Tolerate mechanical ventilation evidenced by VS/agitation level this shift  Outcome: Progressing  Goal: Tolerate pulmonary toileting this shift  Outcome: Progressing  Goal: Verbalize decreased shortness of breath this shift  Outcome: Progressing  Goal: Wean oxygen to maintain O2 saturation per order/standard this shift  Outcome: Progressing  Goal: Increase self care and/or family involvement in next 24 hours  Outcome: Progressing

## 2023-11-06 NOTE — NURSING NOTE
Assumed care of patient. Patient walking in renteria with therapy.     [facility], LPN

## 2023-11-07 LAB
ALBUMIN SERPL BCP-MCNC: 3.7 G/DL (ref 3.4–5)
ALP SERPL-CCNC: 72 U/L (ref 33–136)
ALT SERPL W P-5'-P-CCNC: 22 U/L (ref 7–45)
ANION GAP SERPL CALC-SCNC: 12 MMOL/L (ref 10–20)
AST SERPL W P-5'-P-CCNC: 24 U/L (ref 9–39)
BASOPHILS # BLD AUTO: 0 X10*3/UL (ref 0–0.1)
BASOPHILS NFR BLD AUTO: 0 %
BILIRUB SERPL-MCNC: 0.5 MG/DL (ref 0–1.2)
BUN SERPL-MCNC: 23 MG/DL (ref 6–23)
CALCIUM SERPL-MCNC: 8.7 MG/DL (ref 8.6–10.3)
CHLORIDE SERPL-SCNC: 106 MMOL/L (ref 98–107)
CO2 SERPL-SCNC: 24 MMOL/L (ref 21–32)
CREAT SERPL-MCNC: 0.79 MG/DL (ref 0.5–1.05)
EOSINOPHIL # BLD AUTO: 0 X10*3/UL (ref 0–0.4)
EOSINOPHIL NFR BLD AUTO: 0 %
ERYTHROCYTE [DISTWIDTH] IN BLOOD BY AUTOMATED COUNT: 12.9 % (ref 11.5–14.5)
GFR SERPL CREATININE-BSD FRML MDRD: 76 ML/MIN/1.73M*2
GLUCOSE SERPL-MCNC: 124 MG/DL (ref 74–99)
HCT VFR BLD AUTO: 41.2 % (ref 36–46)
HGB BLD-MCNC: 12.9 G/DL (ref 12–16)
IMM GRANULOCYTES # BLD AUTO: 0.08 X10*3/UL (ref 0–0.5)
IMM GRANULOCYTES NFR BLD AUTO: 1 % (ref 0–0.9)
LYMPHOCYTES # BLD AUTO: 0.63 X10*3/UL (ref 0.8–3)
LYMPHOCYTES NFR BLD AUTO: 8.1 %
MAGNESIUM SERPL-MCNC: 2.21 MG/DL (ref 1.6–2.4)
MCH RBC QN AUTO: 30.1 PG (ref 26–34)
MCHC RBC AUTO-ENTMCNC: 31.3 G/DL (ref 32–36)
MCV RBC AUTO: 96 FL (ref 80–100)
MONOCYTES # BLD AUTO: 0.24 X10*3/UL (ref 0.05–0.8)
MONOCYTES NFR BLD AUTO: 3.1 %
NEUTROPHILS # BLD AUTO: 6.86 X10*3/UL (ref 1.6–5.5)
NEUTROPHILS NFR BLD AUTO: 87.8 %
NRBC BLD-RTO: 0 /100 WBCS (ref 0–0)
PLATELET # BLD AUTO: 105 X10*3/UL (ref 150–450)
POTASSIUM SERPL-SCNC: 4.1 MMOL/L (ref 3.5–5.3)
PROT SERPL-MCNC: 6.3 G/DL (ref 6.4–8.2)
RBC # BLD AUTO: 4.28 X10*6/UL (ref 4–5.2)
SODIUM SERPL-SCNC: 138 MMOL/L (ref 136–145)
WBC # BLD AUTO: 7.8 X10*3/UL (ref 4.4–11.3)

## 2023-11-07 PROCEDURE — 2500000005 HC RX 250 GENERAL PHARMACY W/O HCPCS: Performed by: INTERNAL MEDICINE

## 2023-11-07 PROCEDURE — 83735 ASSAY OF MAGNESIUM: CPT | Performed by: INTERNAL MEDICINE

## 2023-11-07 PROCEDURE — 2500000004 HC RX 250 GENERAL PHARMACY W/ HCPCS (ALT 636 FOR OP/ED): Performed by: INTERNAL MEDICINE

## 2023-11-07 PROCEDURE — 1100000001 HC PRIVATE ROOM DAILY

## 2023-11-07 PROCEDURE — 94668 MNPJ CHEST WALL SBSQ: CPT

## 2023-11-07 PROCEDURE — 2500000001 HC RX 250 WO HCPCS SELF ADMINISTERED DRUGS (ALT 637 FOR MEDICARE OP): Performed by: INTERNAL MEDICINE

## 2023-11-07 PROCEDURE — 36415 COLL VENOUS BLD VENIPUNCTURE: CPT | Performed by: INTERNAL MEDICINE

## 2023-11-07 PROCEDURE — 99233 SBSQ HOSP IP/OBS HIGH 50: CPT | Performed by: NURSE PRACTITIONER

## 2023-11-07 PROCEDURE — 94760 N-INVAS EAR/PLS OXIMETRY 1: CPT

## 2023-11-07 PROCEDURE — 94640 AIRWAY INHALATION TREATMENT: CPT

## 2023-11-07 PROCEDURE — 2500000002 HC RX 250 W HCPCS SELF ADMINISTERED DRUGS (ALT 637 FOR MEDICARE OP, ALT 636 FOR OP/ED): Performed by: INTERNAL MEDICINE

## 2023-11-07 PROCEDURE — 2500000002 HC RX 250 W HCPCS SELF ADMINISTERED DRUGS (ALT 637 FOR MEDICARE OP, ALT 636 FOR OP/ED): Performed by: NURSE PRACTITIONER

## 2023-11-07 PROCEDURE — 80053 COMPREHEN METABOLIC PANEL: CPT | Performed by: INTERNAL MEDICINE

## 2023-11-07 PROCEDURE — 85025 COMPLETE CBC W/AUTO DIFF WBC: CPT | Performed by: INTERNAL MEDICINE

## 2023-11-07 RX ADMIN — METHYLPREDNISOLONE SODIUM SUCCINATE 40 MG: 40 INJECTION, POWDER, FOR SOLUTION INTRAMUSCULAR; INTRAVENOUS at 02:57

## 2023-11-07 RX ADMIN — ALBUTEROL SULFATE 2.5 MG: 2.5 SOLUTION RESPIRATORY (INHALATION) at 04:29

## 2023-11-07 RX ADMIN — BUDESONIDE 0.5 MG: 0.5 INHALANT RESPIRATORY (INHALATION) at 19:35

## 2023-11-07 RX ADMIN — GUAIFENESIN 1200 MG: 600 TABLET ORAL at 20:13

## 2023-11-07 RX ADMIN — METHYLPREDNISOLONE SODIUM SUCCINATE 40 MG: 40 INJECTION, POWDER, FOR SOLUTION INTRAMUSCULAR; INTRAVENOUS at 08:57

## 2023-11-07 RX ADMIN — MULTIPLE VITAMINS W/ MINERALS TAB 1 TABLET: TAB at 08:57

## 2023-11-07 RX ADMIN — MONTELUKAST 10 MG: 10 TABLET, FILM COATED ORAL at 20:13

## 2023-11-07 RX ADMIN — GUAIFENESIN 1200 MG: 600 TABLET ORAL at 08:57

## 2023-11-07 RX ADMIN — Medication 1 L/MIN: at 08:53

## 2023-11-07 RX ADMIN — DOXYCYCLINE HYCLATE 100 MG: 100 TABLET, COATED ORAL at 20:13

## 2023-11-07 RX ADMIN — DOXYCYCLINE HYCLATE 100 MG: 100 TABLET, COATED ORAL at 08:57

## 2023-11-07 RX ADMIN — LEVOTHYROXINE SODIUM 112 MCG: 112 TABLET ORAL at 06:19

## 2023-11-07 RX ADMIN — LORATADINE 10 MG: 10 TABLET ORAL at 08:57

## 2023-11-07 RX ADMIN — METHYLPREDNISOLONE SODIUM SUCCINATE 40 MG: 40 INJECTION, POWDER, FOR SOLUTION INTRAMUSCULAR; INTRAVENOUS at 20:13

## 2023-11-07 RX ADMIN — IPRATROPIUM BROMIDE AND ALBUTEROL SULFATE 3 ML: 2.5; .5 SOLUTION RESPIRATORY (INHALATION) at 13:06

## 2023-11-07 RX ADMIN — IPRATROPIUM BROMIDE AND ALBUTEROL SULFATE 3 ML: 2.5; .5 SOLUTION RESPIRATORY (INHALATION) at 08:51

## 2023-11-07 RX ADMIN — BENZOCAINE AND MENTHOL 1 LOZENGE: 15; 3.6 LOZENGE ORAL at 06:19

## 2023-11-07 RX ADMIN — CITALOPRAM HYDROBROMIDE 20 MG: 20 TABLET ORAL at 08:57

## 2023-11-07 RX ADMIN — IPRATROPIUM BROMIDE AND ALBUTEROL SULFATE 3 ML: 2.5; .5 SOLUTION RESPIRATORY (INHALATION) at 19:35

## 2023-11-07 RX ADMIN — BUDESONIDE 0.5 MG: 0.5 INHALANT RESPIRATORY (INHALATION) at 08:53

## 2023-11-07 RX ADMIN — METHYLPREDNISOLONE SODIUM SUCCINATE 40 MG: 40 INJECTION, POWDER, FOR SOLUTION INTRAMUSCULAR; INTRAVENOUS at 14:30

## 2023-11-07 ASSESSMENT — COGNITIVE AND FUNCTIONAL STATUS - GENERAL
DAILY ACTIVITIY SCORE: 24
MOBILITY SCORE: 24

## 2023-11-07 ASSESSMENT — PAIN SCALES - GENERAL
PAINLEVEL_OUTOF10: 0 - NO PAIN
PAINLEVEL_OUTOF10: 0 - NO PAIN

## 2023-11-07 NOTE — PROGRESS NOTES
"Margaux Harris is a 78 y.o. female on day 1 of admission presenting with COPD exacerbation (CMS/[facility]).      Subjective   The patient reports \"feeling better\" this morning. She is still slightly SOB with exertion. Currently on 1L/NC.       Objective     Last Recorded Vitals  /50 (BP Location: Right arm, Patient Position: Lying)   Pulse 62   Temp 36.5 °C (97.7 °F) (Temporal)   Resp 18   Wt 94.3 kg (207 lb 14.3 oz)   SpO2 94%   Intake/Output last 3 Shifts:    Intake/Output Summary (Last 24 hours) at 11/7/2023 1414  Last data filed at 11/6/2023 1716  Gross per 24 hour   Intake 240 ml   Output --   Net 240 ml       Admission Weight  Weight: 94.3 kg (208 lb) (11/04/23 2155)    Daily Weight  11/05/23 : 94.3 kg (207 lb 14.3 oz)    Image Results  XR chest 1 view  Narrative: INDICATION:  Shortness of breath.  COMPARISON:  None available  TECHNIQUE: AP chest 22:41 hours (two images).  FINDINGS:  The heart and mediastinum are normal.  The lungs are without  infiltrates, masses or pulmonary vascular congestion.  No pneumothorax  or pleural effusion are seen.  No acute osseous changes.  Impression: Normal AP chest.  Signed by Ernesto Cho MD      Physical Exam  Vitals and nursing note reviewed.   Constitutional:       General: She is not in acute distress.     Appearance: She is obese. She appears well.   HENT:      Head: Normocephalic and atraumatic.      Right Ear: External ear normal.      Left Ear: External ear normal.      Nose: Nose normal. No rhinorrhea.      Mouth/Throat:      Mouth: Mucous membranes are moist.      Pharynx: Oropharynx is clear. No oropharyngeal exudate.   Eyes:      General: No scleral icterus.        Right eye: No discharge.         Left eye: No discharge.      Conjunctiva/sclera: Conjunctivae normal.   Cardiovascular:      Rate and Rhythm: Normal rate and regular rhythm.      Pulses: Normal pulses.      Heart sounds: Normal heart sounds. No murmur heard.  Pulmonary:      Effort: Pulmonary " effort is normal. No respiratory distress.      Breath sounds: Wheezing present. No rales.   Abdominal:      General: Abdomen is flat. There is no distension.      Palpations: Abdomen is soft.      Tenderness: There is no abdominal tenderness.   Musculoskeletal:         General: No tenderness.      Right lower leg: No edema.      Left lower leg: No edema.   Skin:     General: Skin is warm and dry.      Capillary Refill: Capillary refill takes less than 2 seconds.      Findings: No rash.   Neurological:      General: No focal deficit present.      Mental Status: She is alert and oriented to person, place, and time. Mental status is at baseline.   Psychiatric:         Mood and Affect: Mood normal.         Behavior: Behavior normal.         Thought Content: Thought content normal.         Judgment: Judgment normal.     Relevant Results  Results for orders placed or performed during the hospital encounter of 11/04/23 (from the past 24 hour(s))   CBC and Auto Differential   Result Value Ref Range    WBC 7.8 4.4 - 11.3 x10*3/uL    nRBC 0.0 0.0 - 0.0 /100 WBCs    RBC 4.28 4.00 - 5.20 x10*6/uL    Hemoglobin 12.9 12.0 - 16.0 g/dL    Hematocrit 41.2 36.0 - 46.0 %    MCV 96 80 - 100 fL    MCH 30.1 26.0 - 34.0 pg    MCHC 31.3 (L) 32.0 - 36.0 g/dL    RDW 12.9 11.5 - 14.5 %    Platelets 105 (L) 150 - 450 x10*3/uL    Neutrophils % 87.8 40.0 - 80.0 %    Immature Granulocytes %, Automated 1.0 (H) 0.0 - 0.9 %    Lymphocytes % 8.1 13.0 - 44.0 %    Monocytes % 3.1 2.0 - 10.0 %    Eosinophils % 0.0 0.0 - 6.0 %    Basophils % 0.0 0.0 - 2.0 %    Neutrophils Absolute 6.86 (H) 1.60 - 5.50 x10*3/uL    Immature Granulocytes Absolute, Automated 0.08 0.00 - 0.50 x10*3/uL    Lymphocytes Absolute 0.63 (L) 0.80 - 3.00 x10*3/uL    Monocytes Absolute 0.24 0.05 - 0.80 x10*3/uL    Eosinophils Absolute 0.00 0.00 - 0.40 x10*3/uL    Basophils Absolute 0.00 0.00 - 0.10 x10*3/uL   Comprehensive Metabolic Panel   Result Value Ref Range    Glucose 124 (H) 74  - 99 mg/dL    Sodium 138 136 - 145 mmol/L    Potassium 4.1 3.5 - 5.3 mmol/L    Chloride 106 98 - 107 mmol/L    Bicarbonate 24 21 - 32 mmol/L    Anion Gap 12 10 - 20 mmol/L    Urea Nitrogen 23 6 - 23 mg/dL    Creatinine 0.79 0.50 - 1.05 mg/dL    eGFR 76 >60 mL/min/1.73m*2    Calcium 8.7 8.6 - 10.3 mg/dL    Albumin 3.7 3.4 - 5.0 g/dL    Alkaline Phosphatase 72 33 - 136 U/L    Total Protein 6.3 (L) 6.4 - 8.2 g/dL    AST 24 9 - 39 U/L    Bilirubin, Total 0.5 0.0 - 1.2 mg/dL    ALT 22 7 - 45 U/L   Magnesium   Result Value Ref Range    Magnesium 2.21 1.60 - 2.40 mg/dL                   Assessment/Plan                  Principal Problem:    COPD exacerbation (CMS/HCC)  Active Problems:    Thrombocytopenia (CMS/HCC)    Hypokalemia    Hypothyroidism    COPD Exacerbation  Acute Respiratory Failure on 1L/NC  -Continue Solu-Medrol 40 mg IV every 8 hours and transition to PO in the morning.   -Continue 3 times daily DuoNebs and twice daily budesonide nebs  -Continue doxycycline 100 mg twice daily (day #2)  -Continue other home meds addressing allergies including nonsedating antihistamine, montelukast, and guaifenesin.  -RT for ambulatory oxygenation evaluation for possible home oxygen. She was using her late fathers oxygen machine at home.     Hypokalemia (resolved)  -Follow with repeat potassium level     Depression/anxiety  -Continue home citalopram     Hypothyroidism  -Continue home levothyroxine. TSH with reflex is at goal.      Chronic thrombocytopenia  -Platelet 105,000  -Follow with repeat CBC    Disposition  Improving clinically will likely be discharged home tomorrow.               Yoana Mitchell, APRN-CNP

## 2023-11-08 ENCOUNTER — PHARMACY VISIT (OUTPATIENT)
Dept: PHARMACY | Facility: CLINIC | Age: 78
End: 2023-11-08
Payer: COMMERCIAL

## 2023-11-08 VITALS
BODY MASS INDEX: 32.63 KG/M2 | WEIGHT: 207.89 LBS | TEMPERATURE: 97.7 F | OXYGEN SATURATION: 92 % | HEIGHT: 67 IN | DIASTOLIC BLOOD PRESSURE: 61 MMHG | RESPIRATION RATE: 16 BRPM | HEART RATE: 81 BPM | SYSTOLIC BLOOD PRESSURE: 157 MMHG

## 2023-11-08 LAB
ANION GAP SERPL CALC-SCNC: 13 MMOL/L (ref 10–20)
BUN SERPL-MCNC: 21 MG/DL (ref 6–23)
CALCIUM SERPL-MCNC: 8.9 MG/DL (ref 8.6–10.3)
CHLORIDE SERPL-SCNC: 104 MMOL/L (ref 98–107)
CO2 SERPL-SCNC: 29 MMOL/L (ref 21–32)
CREAT SERPL-MCNC: 0.88 MG/DL (ref 0.5–1.05)
ERYTHROCYTE [DISTWIDTH] IN BLOOD BY AUTOMATED COUNT: 12.5 % (ref 11.5–14.5)
EST. AVERAGE GLUCOSE BLD GHB EST-MCNC: 105 MG/DL
GFR SERPL CREATININE-BSD FRML MDRD: 67 ML/MIN/1.73M*2
GLUCOSE SERPL-MCNC: 137 MG/DL (ref 74–99)
HBA1C MFR BLD: 5.3 %
HCT VFR BLD AUTO: 41.8 % (ref 36–46)
HGB BLD-MCNC: 13.1 G/DL (ref 12–16)
MCH RBC QN AUTO: 29.8 PG (ref 26–34)
MCHC RBC AUTO-ENTMCNC: 31.3 G/DL (ref 32–36)
MCV RBC AUTO: 95 FL (ref 80–100)
NRBC BLD-RTO: 0 /100 WBCS (ref 0–0)
PLATELET # BLD AUTO: 101 X10*3/UL (ref 150–450)
POTASSIUM SERPL-SCNC: 4.6 MMOL/L (ref 3.5–5.3)
RBC # BLD AUTO: 4.39 X10*6/UL (ref 4–5.2)
SODIUM SERPL-SCNC: 141 MMOL/L (ref 136–145)
WBC # BLD AUTO: 6 X10*3/UL (ref 4.4–11.3)

## 2023-11-08 PROCEDURE — 80048 BASIC METABOLIC PNL TOTAL CA: CPT | Performed by: NURSE PRACTITIONER

## 2023-11-08 PROCEDURE — 36415 COLL VENOUS BLD VENIPUNCTURE: CPT | Mod: GEALAB | Performed by: NURSE PRACTITIONER

## 2023-11-08 PROCEDURE — 2500000005 HC RX 250 GENERAL PHARMACY W/O HCPCS: Performed by: INTERNAL MEDICINE

## 2023-11-08 PROCEDURE — 36415 COLL VENOUS BLD VENIPUNCTURE: CPT | Performed by: NURSE PRACTITIONER

## 2023-11-08 PROCEDURE — RXMED WILLOW AMBULATORY MEDICATION CHARGE

## 2023-11-08 PROCEDURE — 94668 MNPJ CHEST WALL SBSQ: CPT

## 2023-11-08 PROCEDURE — 2500000001 HC RX 250 WO HCPCS SELF ADMINISTERED DRUGS (ALT 637 FOR MEDICARE OP): Performed by: INTERNAL MEDICINE

## 2023-11-08 PROCEDURE — 99239 HOSP IP/OBS DSCHRG MGMT >30: CPT | Performed by: NURSE PRACTITIONER

## 2023-11-08 PROCEDURE — 83036 HEMOGLOBIN GLYCOSYLATED A1C: CPT | Mod: GEALAB | Performed by: NURSE PRACTITIONER

## 2023-11-08 PROCEDURE — 9420000001 HC RT PATIENT EDUCATION 5 MIN

## 2023-11-08 PROCEDURE — 94640 AIRWAY INHALATION TREATMENT: CPT

## 2023-11-08 PROCEDURE — 2500000004 HC RX 250 GENERAL PHARMACY W/ HCPCS (ALT 636 FOR OP/ED): Performed by: INTERNAL MEDICINE

## 2023-11-08 PROCEDURE — 2500000002 HC RX 250 W HCPCS SELF ADMINISTERED DRUGS (ALT 637 FOR MEDICARE OP, ALT 636 FOR OP/ED): Performed by: NURSE PRACTITIONER

## 2023-11-08 PROCEDURE — 94761 N-INVAS EAR/PLS OXIMETRY MLT: CPT

## 2023-11-08 PROCEDURE — 2500000002 HC RX 250 W HCPCS SELF ADMINISTERED DRUGS (ALT 637 FOR MEDICARE OP, ALT 636 FOR OP/ED): Performed by: INTERNAL MEDICINE

## 2023-11-08 PROCEDURE — 85027 COMPLETE CBC AUTOMATED: CPT | Performed by: NURSE PRACTITIONER

## 2023-11-08 RX ORDER — PREDNISONE 20 MG/1
20 TABLET ORAL DAILY
Qty: 5 TABLET | Refills: 0 | Status: SHIPPED | OUTPATIENT
Start: 2023-11-09 | End: 2023-11-14

## 2023-11-08 RX ORDER — GUAIFENESIN 1200 MG/1
1200 TABLET, EXTENDED RELEASE ORAL 2 TIMES DAILY
Qty: 10 TABLET | Refills: 0 | Status: SHIPPED | OUTPATIENT
Start: 2023-11-08 | End: 2023-11-13

## 2023-11-08 RX ORDER — PREDNISONE 20 MG/1
20 TABLET ORAL DAILY
Status: DISCONTINUED | OUTPATIENT
Start: 2023-11-08 | End: 2023-11-08 | Stop reason: HOSPADM

## 2023-11-08 RX ORDER — DOXYCYCLINE HYCLATE 100 MG
100 TABLET ORAL EVERY 12 HOURS SCHEDULED
Qty: 10 TABLET | Refills: 0 | Status: SHIPPED | OUTPATIENT
Start: 2023-11-08 | End: 2023-11-13

## 2023-11-08 RX ADMIN — MULTIPLE VITAMINS W/ MINERALS TAB 1 TABLET: TAB at 08:02

## 2023-11-08 RX ADMIN — METHYLPREDNISOLONE SODIUM SUCCINATE 40 MG: 40 INJECTION, POWDER, FOR SOLUTION INTRAMUSCULAR; INTRAVENOUS at 02:25

## 2023-11-08 RX ADMIN — METHYLPREDNISOLONE SODIUM SUCCINATE 40 MG: 40 INJECTION, POWDER, FOR SOLUTION INTRAMUSCULAR; INTRAVENOUS at 09:30

## 2023-11-08 RX ADMIN — CITALOPRAM HYDROBROMIDE 20 MG: 20 TABLET ORAL at 08:02

## 2023-11-08 RX ADMIN — DOXYCYCLINE HYCLATE 100 MG: 100 TABLET, COATED ORAL at 08:02

## 2023-11-08 RX ADMIN — GUAIFENESIN 1200 MG: 600 TABLET ORAL at 08:02

## 2023-11-08 RX ADMIN — IPRATROPIUM BROMIDE AND ALBUTEROL SULFATE 3 ML: 2.5; .5 SOLUTION RESPIRATORY (INHALATION) at 07:22

## 2023-11-08 RX ADMIN — ALBUTEROL SULFATE 2.5 MG: 2.5 SOLUTION RESPIRATORY (INHALATION) at 04:39

## 2023-11-08 RX ADMIN — LORATADINE 10 MG: 10 TABLET ORAL at 08:02

## 2023-11-08 RX ADMIN — Medication 1 L/MIN: at 07:22

## 2023-11-08 RX ADMIN — BUDESONIDE 0.5 MG: 0.5 INHALANT RESPIRATORY (INHALATION) at 07:22

## 2023-11-08 RX ADMIN — LEVOTHYROXINE SODIUM 112 MCG: 112 TABLET ORAL at 06:11

## 2023-11-08 ASSESSMENT — COGNITIVE AND FUNCTIONAL STATUS - GENERAL
MOBILITY SCORE: 24
DAILY ACTIVITIY SCORE: 24

## 2023-11-08 ASSESSMENT — PAIN - FUNCTIONAL ASSESSMENT: PAIN_FUNCTIONAL_ASSESSMENT: 0-10

## 2023-11-08 ASSESSMENT — PAIN SCALES - GENERAL: PAINLEVEL_OUTOF10: 0 - NO PAIN

## 2023-11-08 NOTE — PROGRESS NOTES
11/08/23 1115   Discharge Planning   Living Arrangements Family members  (patient's ex daughter in law stays with her most of the time)   Support Systems Family members   Assistance Needed A&Ox3, independent, drives   Type of Residence Private residence   Home or Post Acute Services None   Patient expects to be discharged to: Home new O2   Does the patient need discharge transport arranged? No  (No)     11/8/2023 1116: Spoke to patient at bedside to ensure all discharge needs were met. [facility] oxygen delivered by Respiratory Therapy and daughter in law able to transport.

## 2023-11-08 NOTE — CARE PLAN
The patient's goals for the shift include pt will keep spo2 above 92% on room air    The clinical goals for the shift include patient will have improvement with SOB

## 2023-11-08 NOTE — CARE PLAN
Home O2 eval. Room air at rest Spo2 92%. Room air with exertion walking up and down hallway, desat Spo2 87%. Titrated to 2 L NC, SpO2 92%. Updated Doctor, nsg and care planner.

## 2023-11-08 NOTE — NURSING NOTE
2015 Patient awake and watching tv after finishing breathing treatment. O2 intact at 1L via NC. Night medication tolerated without difficulty. Pt denies further needs.     0030 Pt resting with eyes closed, no S/S of distress.

## 2023-11-08 NOTE — DISCHARGE SUMMARY
Discharge Diagnosis  COPD exacerbation (CMS/[facility])    Issues Requiring Follow-Up  COPD exacerbation with new oxygen requirement.     Discharge Meds     Your medication list        START taking these medications        Instructions Last Dose Given Next Dose Due   doxycycline 100 mg tablet  Commonly known as: Vibra-Tabs      Take 1 tablet (100 mg) by mouth every 12 hours for 5 days. Take with a full glass of water and do not lie down for at least 30 minutes after.       oxygen gas therapy  Commonly known as: O2      Inhale 2 L/min once every 24 hours.       predniSONE 20 mg tablet  Commonly known as: Deltasone  Start taking on: November 9, 2023      Take 1 tablet (20 mg) by mouth once daily for 5 days. Do not start before November 9, 2023.              CHANGE how you take these medications        Instructions Last Dose Given Next Dose Due   guaiFENesin 1,200 mg tablet extended release 12hr  Commonly known as: Mucinex  What changed: medication strength      Take 1 tablet (1,200 mg) by mouth 2 times a day for 5 days. Do not crush, chew, or split.              CONTINUE taking these medications        Instructions Last Dose Given Next Dose Due   albuterol 2.5 mg /3 mL (0.083 %) nebulizer solution           budesonide 0.5 mg/2 mL nebulizer solution  Commonly known as: Pulmicort           cetirizine 10 mg chewable tablet  Commonly known as: ZyrTEC           citalopram 20 mg tablet  Commonly known as: CeleXA           ergocalciferol 1.25 MG (87460 UT) capsule  Commonly known as: Vitamin D-2           montelukast 10 mg tablet  Commonly known as: Singulair           multivitamin tablet           multivitamin with minerals iron-free  Commonly known as: Centrum Silver           Synthroid 112 mcg tablet  Generic drug: levothyroxine                     Where to Get Your Medications        These medications were sent to [facility]  [id number], [place] [address]      Hours: 9 AM to 5 PM Mon-Fri Phone: [phone]    doxycycline 100 mg tablet  guaiFENesin 1,200 mg tablet extended release 12hr  predniSONE 20 mg tablet       Information about where to get these medications is not yet available    Ask your nurse or doctor about these medications  oxygen gas therapy         Test Results Pending At Discharge  Pending Labs       Order Current Status    Hemoglobin A1c In process            Hospital Course     Margaux Harris is an 78 y.o. female with history of COPD presenting with SOB. She reports being in her USOH until 1-1.5 weeks ago when she became progressively SOB. She did not have her respiratory for at least a month as her established pulmonologist was out of the office. She was admitted with COPD exacerbation and acute Respiratory Failure requiring supplemental oxygen. She was treated with solumedrol and later transitioned to prednisone. She was also treated with 3 days of doxycycline BID. RT recommended sending the patient home on 2L/NC supplemental oxygen and that was sent up for discharge home. Her established pulmonologist has returned, so she will call her to schedule a outpatient follow up. Meds sent to our outpatient pharmacy.         Pertinent Physical Exam At Time of Discharge  Constitutional:       General: She is not in acute distress.     Appearance: She is obese. She appears well.   HENT:      Head: Normocephalic and atraumatic.      Right Ear: External ear normal.      Left Ear: External ear normal.      Nose: Nose normal. No rhinorrhea.      Mouth/Throat:      Mouth: Mucous membranes are moist.      Pharynx: Oropharynx is clear. No oropharyngeal exudate.   Eyes:      General: No scleral icterus.        Right eye: No discharge.         Left eye: No discharge.      Conjunctiva/sclera: Conjunctivae normal.   Cardiovascular:      Rate and Rhythm: Normal rate and regular rhythm.      Pulses: Normal pulses.      Heart sounds: Normal heart sounds. No murmur heard.  Pulmonary:      Effort: Pulmonary effort is normal.  No respiratory distress.      Breath sounds: wheezing improved. No rales.   Abdominal:      General: Abdomen is flat. There is no distension.      Palpations: Abdomen is soft.      Tenderness: There is no abdominal tenderness.   Musculoskeletal:         General: No tenderness.      Right lower leg: No edema.      Left lower leg: No edema.   Skin:     General: Skin is warm and dry.      Capillary Refill: Capillary refill takes less than 2 seconds.      Findings: No rash.   Neurological:      General: No focal deficit present.      Mental Status: She is alert and oriented to person, place, and time. Mental status is at baseline.   Psychiatric:         Mood and Affect: Mood normal.         Behavior: Behavior normal.         Thought Content: Thought content normal.         Judgment: Judgment normal.     Outpatient Follow-Up  No future appointments.      Yoana Mitchell, APRN-CNP

## 2023-11-08 NOTE — NURSING NOTE
1444 Discharge instructions reviewed with pt and pts family. All questions and concerns addressed. Waiting on pharmacy to bring up meds. IV removed.

## 2023-11-08 NOTE — NURSING NOTE
Assumed care of patient. Patient in bed resting.    Walking pulse ox home test done with RT. Patient will qualify for oxygen on discharge. Scripts ready at desk. Oxygen tank in room ready for when patient discharges.      ANGELA MURPHY, LORIE

## 2023-11-11 NOTE — SIGNIFICANT EVENT
Follow Up Phone Call    Outgoing phone call    Spoke to: Margaux Harris Relationship:self   Phone number: [phone]      Outcome: I left a message on answering machine   Chief Complaint   Patient presents with    Shortness of Breath     Patient with history of COPD presents with SOB. EMS gave 2 duonebs, 125mg solumedrol, and 25 IV benadryl for her seasonal allergies. EMS stated patient was  wheezing upon arrival and on 5L oxygen NC (family oxygen machine, not hers, normally on room air). Patient now on room air after breathing treatment and reports feeling much better.          Diagnosis:Not applicable

## 2024-01-02 ENCOUNTER — APPOINTMENT (OUTPATIENT)
Dept: PULMONOLOGY | Facility: CLINIC | Age: 79
End: 2024-01-02
Payer: MEDICARE

## 2024-01-23 ENCOUNTER — APPOINTMENT (OUTPATIENT)
Dept: PULMONOLOGY | Facility: CLINIC | Age: 79
End: 2024-01-23
Payer: MEDICARE

## 2024-01-29 NOTE — PROGRESS NOTES
Patient: Margaux Harris    [id number]  : 1945 -- AGE 78 y.o.    Provider: MADHAVI Townsend     Location [facility]   Service Date: 2024       Department of Medicine  Division of Pulmonary, Critical Care, and Sleep Medicine           [facility]  New Visit Note    HISTORY OF PRESENT ILLNESS     PCP: Claritza Quevedo PA-C    HISTORY OF PRESENT ILLNESS   Margaux Harris is a 78 y.o. female who presents to a [facility] for an evaluation with concerns of COPD.   I have independently interviewed and examined the patient in the office and reviewed available records.    Current History  Patient had a recent hospitalization from 2023 through 2023 for COPD exacerbation.  Prior to admission she had become feeling progressively more short of breath over the past 1-1/2 weeks.  Was treated with Solu-Medrol and transition to present and also treated with antibiotics.  Respiratory failure requiring oxygen and was discharged home on 2 L nasal cannula.  She is a prior patient of Dr. Morin for pulmonary care; last seen 2019.  Upon intake, she states she was seeing a private practice pulmonologist in between Dr. Morin and now; unknown who this is.    On today's visit, the patient reports she is feeling better since discharge. States she was diagnosed with asthma around . Formerly saw Dr. Kennedy for allergy care. Has been off allergy shots since September. States that she had ran out of her inhaler/neb therapy for a period of time and was out of her medications leading up to her recent hospitalization. Uses budesonide nebulizer BID daily. Uses Perforomist nebulizers daily. States she has a hx of asthma dating back many years. Had bronchial issues in childhood. Denies SOB at rest. Does have NAIR; going up and down stairs is most noticeable. The NAIR has been present since ~. Symptoms have gotten progressively  "worse since then. Does report a chronic cough; productive. Clear. Reports regular wheezing. Denies orthopnea. Denies lower leg swelling. Intermittent sinusitis issues (takes Zyrtec and Singulair daily). Has been on allergy shots for \"years\". Occasional GERD symptoms; will use PRN Tums or use peppermints. No CP, palpitations. Lost 10 lbs when sick in the hospital; otherwise weight is stable. No recent fever, chills, sweats. Was sent home with oxygen from the hospital. States she wore it briefly after hospital discharge. Wore for a couple days but then took herself off of it. Was advised to use on exertion. Will check her SpO2 level at home; typically will run 93-96% on room air sitting at rest. After exertion, will go as low as 90%.    No premature birth. Was told she had bronchitis at birth. Had bronchial issues growing up. No other pulm admissions besides what was already mentioned.    No snoring. No waking up choking/gasping. No daytime fatigue. No AM headaches.     CAT Today: 25  ACT Today: 12  ESS Today: 5    Hospital Course 11/5/23 - 11/8/23  Margaux Harris is an 78 y.o. female with history of COPD presenting with SOB. She reports being in her USOH until 1-1.5 weeks ago when she became progressively SOB. She did not have her respiratory for at least a month as her established pulmonologist was out of the office. She was admitted with COPD exacerbation and acute Respiratory Failure requiring supplemental oxygen. She was treated with solumedrol and later transitioned to prednisone. She was also treated with 3 days of doxycycline BID. RT recommended sending the patient home on 2L/NC supplemental oxygen and that was sent up for discharge home. Her established pulmonologist has returned, so she will call her to schedule an outpatient follow up. Meds sent to our outpatient pharmacy.     Dr. Morin Note 8/1/19  History of Present Illness  73-year-old  woman with moderate COPD/asthma, allergic rhinitis and mild " pulmonary hypertension presents for routine follow-up. The patient has been doing well. She becomes short of breath only in hot and humid weather but has air-conditioning at home. No shortness of breath in air-conditioning. She has not been able to exercise because of the heat and humidity. Compliant with budesonide and Perforomist twice daily. Takes Duonebs 2-3 times a week. Occasional dry cough. Occasional wheezing only when walking in hot humid weather, which she does when her dog needs to go out. No hospitalizations or ER visits, or COPD exacerbation in 6 months. Nasal allergies have slightly worsened since she has been unable to continue with immunotherapy. Dr. Gutierrez moved out of his [place] office. The patient needs a new allergist.     Provider Impressions     1. Moderate COPD/asthma, controlled. Breathing worse in heat and humidity only. But tends to worsen seasonally in the spring and fall   - Continue with budesonide nebulization and Perforomist nebulization twice daily. Take DuoNeb as needed. May take budesonide 3 times daily in the spring and fall if symptoms worsen. Advised patient to try to walk more. Aim for 20-30 minutes of walking daily. Stay in air-conditioning in hot and humid weather.  2. Allergic rhinitis, slightly worse since she has not been able to receive immunotherapy. Her allergist moved.   - Continue with fluticasone nasal spray, 2 sprays in each nostril daily. Continue with montelukast 10 mg at bedtime. Cetirizine 10 mg (generic Zyrtec) at bedtime. The patient will try to resume immunotherapy with Dr. Kennedy of allergy in [place].  3. Mild pulmonary hypertension, stable  REVIEW OF SYSTEMS     REVIEW OF SYSTEMS  Review of Systems      ALLERGIES AND MEDICATIONS     ALLERGIES  No Known Allergies    MEDICATIONS  Current Outpatient Medications   Medication Sig Dispense Refill    albuterol 2.5 mg /3 mL (0.083 %) nebulizer solution Take 3 mL (2.5 mg) by nebulization.      albuterol 90  mcg/actuation aerosol powdr breath activated inhaler Inhale 2 puffs every 6 hours if needed for shortness of breath.      budesonide (Pulmicort) 0.5 mg/2 mL nebulizer solution Take 2 mL (0.5 mg) by nebulization 2 times a day. Rinse mouth with water after use to reduce aftertaste and incidence of candidiasis. Do not swallow.      cetirizine (ZyrTEC) 10 mg tablet Take 1 tablet (10 mg) by mouth once daily.      citalopram (CeleXA) 20 mg tablet Take 1 tablet (20 mg) by mouth once daily.      ergocalciferol (Vitamin D-2) 1.25 MG (11935 UT) capsule Take 1 capsule (1,250 mcg) by mouth 1 (one) time per week.      levothyroxine (Synthroid) 112 mcg tablet Take 1 tablet (112 mcg) by mouth once daily in the morning. Take before meals.      montelukast (Singulair) 10 mg tablet Take 1 tablet (10 mg) by mouth once daily at bedtime.      mv-mn/folic ac/calcium/vit K1 (WOMEN'S 50 PLUS MULTIVITAMIN ORAL) Take 1 tablet by mouth once daily.      oxygen (O2) gas therapy Inhale 2 L/min once every 24 hours. (Patient taking differently: Inhale 2 L/min once every 24 hours. Uses her fathers O2 concentrator as needed   No active O2 order)       No current facility-administered medications for this visit.       PAST HISTORY     PAST MEDICAL HISTORY  She  has a past medical history of Arthritis, COPD (chronic obstructive pulmonary disease) (CMS/[facility]), Disease of thyroid gland, and Personal history of other diseases of the respiratory system (01/13/2015).    PAST SURGICAL HISTORY  Past Surgical History:   Procedure Laterality Date    APPENDECTOMY  09/20/2013    Appendectomy    CHOLECYSTECTOMY  09/16/2013    Cholecystectomy Laparoscopic    DILATION AND CURETTAGE OF UTERUS  09/20/2013    Dilation And Curettage Of Cervical Stump    INCISION AND DRAINAGE OF WOUND  09/20/2013    Incision And Drainage Of Skin Abscess    OTHER SURGICAL HISTORY  09/20/2013    Ovarian Cystectomy    TONSILLECTOMY  09/20/2013    Tonsillectomy       IMMUNIZATION  "HISTORY  Immunization History   Administered Date(s) Administered    Moderna SARS-CoV-2 Vaccination 02/12/2021, 03/15/2021, 12/03/2021    Pfizer COVID-19 vaccine, bivalent, age 12 years and older (30 mcg/0.3 mL) 10/12/2022       SOCIAL HISTORY  She  reports that she quit smoking about 32 years ago. Her smoking use included cigarettes. She started smoking about 65 years ago. She has a 66.00 pack-year smoking history. She has never used smokeless tobacco. She reports that she does not currently use alcohol. She reports that she does not currently use drugs.     OCCUPATIONAL/ENVIRONMENTAL HISTORY  Previously worked as: . 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 Lots of fumes and dusts.  Currently works as: retired  DOES/DOES NOT: does not have known exposure to asbestos, silica, beryllium or inhaled metals.  DOES/DOES NOT: does have exposure to birds or exotic animals.  - Pet parakeet in childhood    FAMILY HISTORY  Family History   Problem Relation Name Age of Onset    Heart failure Mother       DOES/DOES NOT: does not have a family history of pulmonary disease.  DOES/DOES NOT: does have a family history of cancer.  -Daughter; SCLC (smoker)  DOES/DOES NOT: does not have a family history of autoimmune disorders.    PHYSICAL EXAM     VITAL SIGNS: /68   Pulse 60   Ht 1.702 m (5' 7\")   Wt 92.1 kg (203 lb)   SpO2 95%   BMI 31.79 kg/m²      PREVIOUS WEIGHTS:  Wt Readings from Last 3 Encounters:   01/30/24 92.1 kg (203 lb)   11/05/23 94.3 kg (207 lb 14.3 oz)       Physical Exam  Vitals reviewed.   Constitutional:       General: She is not in acute distress.     Appearance: Normal appearance. She is not ill-appearing or toxic-appearing.   HENT:      Head: Normocephalic.      Nose: No rhinorrhea.   Cardiovascular:      Rate and Rhythm: Normal rate and regular rhythm.      Heart sounds: Normal heart sounds.   Pulmonary:      Effort: Pulmonary effort is normal. No respiratory distress.      " Breath sounds: No stridor. Wheezing present.      Comments: Faint scattered wheeze  Abdominal:      General: Abdomen is flat.   Musculoskeletal:         General: Normal range of motion.      Right lower leg: No edema.      Left lower leg: No edema.   Skin:     General: Skin is warm and dry.      Nails: There is no clubbing.   Neurological:      General: No focal deficit present.      Mental Status: She is alert and oriented to person, place, and time.   Psychiatric:         Mood and Affect: Mood normal.         Behavior: Behavior normal.         Judgment: Judgment normal.       RESULTS/DATA     Pulmonary Function Test Results   PFT  11: FEV1/FVC: 53, FEV1: 1.46 (57%), FVC: 2.78 (86%), +BD response, ZDY41-23: 21%, T.63 (104%), RV/T%, DLCO: 62%    Chest Radiograph     XR chest 1 view 2023    Narrative  INDICATION:  Shortness of breath.  COMPARISON:  None available  TECHNIQUE: AP chest 22:41 hours (two images).  FINDINGS:  The heart and mediastinum are normal.  The lungs are without  infiltrates, masses or pulmonary vascular congestion.  No pneumothorax  or pleural effusion are seen.  No acute osseous changes.    Impression  Normal AP chest.  Signed by Ernesto Cho MD      Chest CT Scan     No results found for this or any previous visit from the past 365 days.      Echocardiogram & Cardiac Studies     No results found for this or any previous visit from the past 365 days.       Labwork & Pathology   Complete Blood Count  Lab Results   Component Value Date    WBC 6.0 2023    HGB 13.1 2023    HCT 41.8 2023    MCV 95 2023     (L) 2023       Peripheral Eosinophil Count:   Eosinophils Absolute (x10*3/uL)   Date Value   2023 0.00   2023 0.00   2023 0.00       Metabolic Parameters  Sodium   Date/Time Value Ref Range Status   2023 06:20  136 - 145 mmol/L Final     Potassium   Date/Time Value Ref Range Status   2023 06:20 AM 4.6  "3.5 - 5.3 mmol/L Final     Chloride   Date/Time Value Ref Range Status   11/08/2023 06:20  98 - 107 mmol/L Final     Bicarbonate   Date/Time Value Ref Range Status   11/08/2023 06:20 AM 29 21 - 32 mmol/L Final     Anion Gap   Date/Time Value Ref Range Status   11/08/2023 06:20 AM 13 10 - 20 mmol/L Final     Urea Nitrogen   Date/Time Value Ref Range Status   11/08/2023 06:20 AM 21 6 - 23 mg/dL Final     Creatinine   Date/Time Value Ref Range Status   11/08/2023 06:20 AM 0.88 0.50 - 1.05 mg/dL Final     Glucose   Date/Time Value Ref Range Status   11/08/2023 06:20  (H) 74 - 99 mg/dL Final     Calcium   Date/Time Value Ref Range Status   11/08/2023 06:20 AM 8.9 8.6 - 10.3 mg/dL Final     AST   Date/Time Value Ref Range Status   11/07/2023 06:22 AM 24 9 - 39 U/L Final     ALT   Date/Time Value Ref Range Status   11/07/2023 06:22 AM 22 7 - 45 U/L Final     Comment:     Patients treated with Sulfasalazine may generate falsely decreased results for ALT.       Coagulation Parameters  Protime   Date/Time Value Ref Range Status   11/04/2023 10:37 PM 11.1 9.8 - 12.8 seconds Final     INR   Date/Time Value Ref Range Status   11/04/2023 10:37 PM 1.0 0.9 - 1.1 Final       Serum Immunoglobulin E:    No results found for: \"IGE\"     Bronchoscopy & Sputum Cultures       ASSESSMENT/PLAN     Ms. Harris is a 78 y.o. female; was referred to the [facility] for evaluation of COPD    Problem List and Orders  Diagnoses and all orders for this visit:  Asthma-COPD overlap syndrome  -     fluticasone-umeclidin-vilanter (Trelegy Ellipta) 100-62.5-25 mcg blister with device; Inhale 1 puff once daily. Rinse mouth with water after use to reduce aftertaste and incidence of candidiasis. Do not swallow.  -     Complete Pulmonary Function Test Pre/Post Bronchodialator (Spirometry Pre/Post/DLCO/Lung Volumes); Future  -     CBC and Auto Differential; Future  -     Immunocap IgE; Future  -     Pulmonary " Stress Test (6 Min. Walk); Future  Allergy, initial encounter  -     Referral to Allergy; Future      Assessment and Plan / Recommendations:  Asthma-COPD Overlap: States she was diagnosed with asthma back in the 1970's. Has also dealt with allergy issues all her life. Thinks her breathing got worse around ~2011 when she completed a PFT study which at that time; showed moderate obstruction with +BD response, small airways reactivity and air trapping with moderately reduced DLCO. Was told by Dr. Morin that she has both Asthma w/ COPD. Does not appear to have PFT testing since. For a number of years, has been on nebulized budesonide and Perforomist as her maintenance therapy. Has never been on actual maintenance inhalers before. Will use Albuterol HFA and nebs PRN. Leading up to recent hospitalization in 11/2023; she had been out of all her nebulized medications for some time. Reports having NAIR, chronic cough with clear mucous, frequent wheezing.  - Start Trelegy 100; 1 inhalation once a day  - Stop budesonide nebs  - Stop Perforomist nebs  - Continue Albuterol HFA and nebs PRN  - Ordering Full PFT and 6MWT  - Ordering Immunocap IgE and CBC+ diff for eosinophils; checking biomarker phenotyping    2. Former Cigarette Smoker: Smoked upwards of 2 ppd x 33 years. Quit in 1992. 66 total pack years.  - Does not qualify for lung cancer screening protocol given her age and length of quit    3. Chronic Allergies: Reports having allergy issues for decades and has been on allergy shots for significant period of time.  Formerly managed by Dr. Kennedy.  She is in need of a new allergist.  Has been off allergy shots since September 2023.  - Allergy referral placed    RTC 3 months

## 2024-01-30 ENCOUNTER — LAB (OUTPATIENT)
Dept: LAB | Facility: LAB | Age: 79
End: 2024-01-30
Payer: MEDICARE

## 2024-01-30 ENCOUNTER — OFFICE VISIT (OUTPATIENT)
Dept: PULMONOLOGY | Facility: CLINIC | Age: 79
End: 2024-01-30
Payer: MEDICARE

## 2024-01-30 VITALS
WEIGHT: 203 LBS | BODY MASS INDEX: 31.86 KG/M2 | OXYGEN SATURATION: 95 % | DIASTOLIC BLOOD PRESSURE: 68 MMHG | HEIGHT: 67 IN | SYSTOLIC BLOOD PRESSURE: 133 MMHG | HEART RATE: 60 BPM

## 2024-01-30 DIAGNOSIS — Z87.891 FORMER CIGARETTE SMOKER: ICD-10-CM

## 2024-01-30 DIAGNOSIS — J44.89 ASTHMA-COPD OVERLAP SYNDROME (MULTI): Primary | ICD-10-CM

## 2024-01-30 DIAGNOSIS — T78.40XA ALLERGY, INITIAL ENCOUNTER: ICD-10-CM

## 2024-01-30 DIAGNOSIS — J44.89 ASTHMA-COPD OVERLAP SYNDROME (MULTI): ICD-10-CM

## 2024-01-30 LAB
BASOPHILS # BLD AUTO: 0.03 X10*3/UL (ref 0–0.1)
BASOPHILS NFR BLD AUTO: 0.4 %
EOSINOPHIL # BLD AUTO: 0.18 X10*3/UL (ref 0–0.4)
EOSINOPHIL NFR BLD AUTO: 2.5 %
ERYTHROCYTE [DISTWIDTH] IN BLOOD BY AUTOMATED COUNT: 13.1 % (ref 11.5–14.5)
HCT VFR BLD AUTO: 43.9 % (ref 36–46)
HGB BLD-MCNC: 13.9 G/DL (ref 12–16)
IMM GRANULOCYTES # BLD AUTO: 0.02 X10*3/UL (ref 0–0.5)
IMM GRANULOCYTES NFR BLD AUTO: 0.3 % (ref 0–0.9)
LYMPHOCYTES # BLD AUTO: 2.07 X10*3/UL (ref 0.8–3)
LYMPHOCYTES NFR BLD AUTO: 28.8 %
MCH RBC QN AUTO: 31 PG (ref 26–34)
MCHC RBC AUTO-ENTMCNC: 31.7 G/DL (ref 32–36)
MCV RBC AUTO: 98 FL (ref 80–100)
MONOCYTES # BLD AUTO: 0.39 X10*3/UL (ref 0.05–0.8)
MONOCYTES NFR BLD AUTO: 5.4 %
NEUTROPHILS # BLD AUTO: 4.49 X10*3/UL (ref 1.6–5.5)
NEUTROPHILS NFR BLD AUTO: 62.6 %
NRBC BLD-RTO: 0 /100 WBCS (ref 0–0)
PLATELET # BLD AUTO: 121 X10*3/UL (ref 150–450)
RBC # BLD AUTO: 4.48 X10*6/UL (ref 4–5.2)
WBC # BLD AUTO: 7.2 X10*3/UL (ref 4.4–11.3)

## 2024-01-30 PROCEDURE — 82785 ASSAY OF IGE: CPT

## 2024-01-30 PROCEDURE — 1159F MED LIST DOCD IN RCRD: CPT | Performed by: NURSE PRACTITIONER

## 2024-01-30 PROCEDURE — 99215 OFFICE O/P EST HI 40 MIN: CPT | Mod: 27 | Performed by: NURSE PRACTITIONER

## 2024-01-30 PROCEDURE — 1160F RVW MEDS BY RX/DR IN RCRD: CPT | Performed by: NURSE PRACTITIONER

## 2024-01-30 PROCEDURE — 36415 COLL VENOUS BLD VENIPUNCTURE: CPT

## 2024-01-30 PROCEDURE — 99205 OFFICE O/P NEW HI 60 MIN: CPT | Performed by: NURSE PRACTITIONER

## 2024-01-30 PROCEDURE — 1125F AMNT PAIN NOTED PAIN PRSNT: CPT | Performed by: NURSE PRACTITIONER

## 2024-01-30 PROCEDURE — 85025 COMPLETE CBC W/AUTO DIFF WBC: CPT

## 2024-01-30 RX ORDER — FLUTICASONE FUROATE, UMECLIDINIUM BROMIDE AND VILANTEROL TRIFENATATE 100; 62.5; 25 UG/1; UG/1; UG/1
1 POWDER RESPIRATORY (INHALATION) DAILY
Qty: 1 EACH | Refills: 3 | Status: SHIPPED | OUTPATIENT
Start: 2024-01-30 | End: 2024-04-29 | Stop reason: SDUPTHER

## 2024-01-30 ASSESSMENT — PAIN SCALES - GENERAL: PAINLEVEL: 2

## 2024-01-30 NOTE — PATIENT INSTRUCTIONS
Today we discussed your pulmonary history, symptoms and our plan moving forward.    -Start Trelegy 100; 1 inhalation once a day.  Rinse mouth after use.  This will be your long-acting daily maintenance inhaler. (Please contact my office if you have any difficulty getting this prescription; we can help with any paperwork if needed).  -Stop budesonide nebulizers  -Stop Perforomist nebulizers  -Continue albuterol inhaler/nebulizer as needed for shortness of breath  -Ordering pulmonary function testing (please stop using your Trelegy inhaler for 5 days prior to testing date.  You may use your albuterol during this time if needed.  Resume Trelegy after testing completion)  -Ordering 2 labs looking at your inflammatory markers of asthma  -I placed a referral for you to get established with a new allergist    Thank you for visiting the pulmonary clinic today! It was a pleasure to participate in your care.  Please return to clinic 3 months or sooner if needed.    Davey Parrish, CNP  My Office Number: [phone]   CT Scheduling: [phone]  PFT/Follow Up Visit Scheduling: [phone]  My Nurse: MARLENY Walters  My : [place]    **For immediate needs such as medication issues/refills, active sick symptoms/medical concerns; I ask that you please call the office and speak to the pulmonary nurse. MyChart messages do not come directly to me. There can sometimes be a delay before I am aware of any messages that were sent. Thank you.**

## 2024-01-31 LAB — TOTAL IGE SMQN RAST: 8.28 KU/L

## 2024-02-16 ENCOUNTER — HOSPITAL ENCOUNTER (OUTPATIENT)
Dept: RESPIRATORY THERAPY | Facility: HOSPITAL | Age: 79
Discharge: HOME | End: 2024-02-16
Payer: MEDICARE

## 2024-02-16 DIAGNOSIS — J44.89 ASTHMA-COPD OVERLAP SYNDROME (MULTI): ICD-10-CM

## 2024-02-16 PROCEDURE — 94760 N-INVAS EAR/PLS OXIMETRY 1: CPT

## 2024-02-16 PROCEDURE — 94060 EVALUATION OF WHEEZING: CPT | Performed by: INTERNAL MEDICINE

## 2024-02-16 PROCEDURE — 94726 PLETHYSMOGRAPHY LUNG VOLUMES: CPT | Performed by: INTERNAL MEDICINE

## 2024-02-16 PROCEDURE — 94729 DIFFUSING CAPACITY: CPT

## 2024-02-16 PROCEDURE — 94664 DEMO&/EVAL PT USE INHALER: CPT

## 2024-02-16 PROCEDURE — 94618 PULMONARY STRESS TESTING: CPT

## 2024-02-16 PROCEDURE — 94726 PLETHYSMOGRAPHY LUNG VOLUMES: CPT

## 2024-02-16 PROCEDURE — 94618 PULMONARY STRESS TESTING: CPT | Performed by: INTERNAL MEDICINE

## 2024-02-16 PROCEDURE — 98960 EDU&TRN PT SELF-MGMT NQHP 1: CPT

## 2024-02-16 PROCEDURE — 94060 EVALUATION OF WHEEZING: CPT

## 2024-02-16 PROCEDURE — 94729 DIFFUSING CAPACITY: CPT | Performed by: INTERNAL MEDICINE

## 2024-02-19 LAB
MGC ASCENT PFT - FEV1 - POST: 1.48
MGC ASCENT PFT - FEV1 - POST: 1.48
MGC ASCENT PFT - FEV1 - PRE: 1.01
MGC ASCENT PFT - FEV1 - PRE: 1.01
MGC ASCENT PFT - FEV1 - PREDICTED: 2.16
MGC ASCENT PFT - FEV1 - PREDICTED: 2.16
MGC ASCENT PFT - FVC - POST: 3.15
MGC ASCENT PFT - FVC - POST: 3.15
MGC ASCENT PFT - FVC - PRE: 2.5
MGC ASCENT PFT - FVC - PRE: 2.5
MGC ASCENT PFT - FVC - PREDICTED: 2.87
MGC ASCENT PFT - FVC - PREDICTED: 2.87

## 2024-04-12 ENCOUNTER — TELEPHONE (OUTPATIENT)
Dept: PULMONOLOGY | Facility: HOSPITAL | Age: 79
End: 2024-04-12
Payer: MEDICARE

## 2024-04-12 DIAGNOSIS — J44.89 ASTHMA-COPD OVERLAP SYNDROME (MULTI): Primary | ICD-10-CM

## 2024-04-12 RX ORDER — AMOXICILLIN AND CLAVULANATE POTASSIUM 875; 125 MG/1; MG/1
1 TABLET, FILM COATED ORAL 2 TIMES DAILY
Qty: 14 TABLET | Refills: 0 | Status: SHIPPED | OUTPATIENT
Start: 2024-04-12 | End: 2024-04-29 | Stop reason: ALTCHOICE

## 2024-04-12 RX ORDER — PREDNISONE 20 MG/1
40 TABLET ORAL DAILY
Qty: 10 TABLET | Refills: 0 | Status: SHIPPED | OUTPATIENT
Start: 2024-04-12 | End: 2024-04-17

## 2024-04-12 NOTE — TELEPHONE ENCOUNTER
Patient called complaining of a productive cough with yellow sputum, chills, body aches, chest tightness, wheezing, SOB with rest and exertion, and increase oxygen use from 2L to 3L.  Patient mentioned that her PCP had prescribed Amoxicillin which she started to take yesterday.  Patient was unsure of how hold the prescription was and noticed it was originally prescribed for 10 days but there were only 5 days left.  This nurse contacted Davey Parrish CNP who has prescribed Augmentin and a prednisone burst.  He also advised the patient stop taking the Amoxicillin.  This nurse contacted the patient who expressed understanding and appreciation for the call.

## 2024-04-25 NOTE — PROGRESS NOTES
Patient: Margaux Harris    [id number]  : 1945 -- AGE 78 y.o.    Provider: MADHAVI Townsend     Location [facility]   Service Date: 2024       Department of Medicine  Division of Pulmonary, Critical Care, and Sleep Medicine       [facility]  Follow Up Visit Note    HISTORY OF PRESENT ILLNESS     HISTORY OF PRESENT ILLNESS   Margaux Harris is a 78 y.o. female who presents to a [facility] for a follow up visit with concerns of Asthma/COPD Overlap.   I have independently interviewed and examined the patient in the office and reviewed available records.    DATE OF LAST VISIT: 2024    Current History  Since last visit, patient completed previously ordered testing.  Reviewed all results with her today.  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  6-minute walk from 2024 showed an SpO2 addy of 91% on room air.  Serum eosinophil count from 2024 was mildly increased at 180 and ImmunoCAP IgE level was normal at 8.  Last visit I had started her on Trelegy 100.    On today's visit, She reports since being on the Trelegy 100 she has felt significantly improved and her breathing has never been better.  Seldom ever feels like she needs her albuterol treatments now.  She did recently have a flaring of her COPD in mid April in which I treated her with Augmentin and prednisone; her symptoms improved with this course of treatment.  Otherwise, she has not had any issues with her breathing and is much happier on the Trelegy inhaler.  The cough and mucus is noticeably improved.  No notable wheezing.  Still waiting to get established with a new allergist; I did suggest Dr. Javier Gutierrez.  She told me that a few weeks ago she had car problems and was forced to walk a longer distance and was able to  "walk 0.5-0.75 of a mile without having to stop.  Was very happy about this.    CAT Today: 11  ACT Today: 20    Prior Visits & History   01/30/2024: Patient had a recent hospitalization from 11/5/2023 through 11/8/2023 for COPD exacerbation.  Prior to admission she had become feeling progressively more short of breath over the past 1-1/2 weeks.    Was treated with Solu-Medrol and transition to present and also treated with antibiotics.  Respiratory failure requiring oxygen and was discharged home on 2 L nasal cannula.    She is a prior patient of Dr. Morin for pulmonary care; last seen 8/1/2019.  Upon intake, she states she was seeing a private practice pulmonologist in between Dr. Morin and now; unknown who this is.    On today's visit, the patient reports she is feeling better since discharge. States she was diagnosed with asthma around 1970. Formerly saw Dr. Kennedy for allergy care. Has been off allergy shots since September.   States that she had ran out of her inhaler/neb therapy for a period of time and was out of her medications leading up to her recent hospitalization. Uses budesonide nebulizer BID daily. Uses Perforomist nebulizers daily.   States she has a hx of asthma dating back many years. Had bronchial issues in childhood. Denies SOB at rest. Does have NAIR; going up and down stairs is most noticeable. The NAIR has been present since ~2011.   Symptoms have gotten progressively worse since then. Does report a chronic cough; productive. Clear. Reports regular wheezing. Denies orthopnea. Denies lower leg swelling.   Intermittent sinusitis issues (takes Zyrtec and Singulair daily). Has been on allergy shots for \"years\". Occasional GERD symptoms; will use PRN Tums or use peppermints.   No CP, palpitations. Lost 10 lbs when sick in the hospital; otherwise weight is stable. No recent fever, chills, sweats. Was sent home with oxygen from the hospital. States she wore it briefly after hospital discharge.   Wore for " a couple days but then took herself off of it. Was advised to use on exertion. Will check her SpO2 level at home; typically will run 93-96% on room air sitting at rest. After exertion, will go as low as 90%.    No premature birth. Was told she had bronchitis at birth. Had bronchial issues growing up. No other pulm admissions besides what was already mentioned.    No snoring. No waking up choking/gasping. No daytime fatigue. No AM headaches.     CAT Today: 25  ACT Today: 12  ESS Today: 5    Hospital Course 11/5/23 - 11/8/23  Margaux Harris is an 78 y.o. female with history of COPD presenting with SOB. She reports being in her USOH until 1-1.5 weeks ago when she became progressively SOB.   She did not have her respiratory for at least a month as her established pulmonologist was out of the office. She was admitted with COPD exacerbation and acute Respiratory Failure requiring supplemental oxygen.   She was treated with solumedrol and later transitioned to prednisone. She was also treated with 3 days of doxycycline BID.     RT recommended sending the patient home on 2L/NC supplemental oxygen and that was sent up for discharge home. Her established pulmonologist has returned, so she will call her to schedule an outpatient follow up. Meds sent to our outpatient pharmacy.     Dr. Morin Note 8/1/19  History of Present Illness  73-year-old  woman with moderate COPD/asthma, allergic rhinitis and mild pulmonary hypertension presents for routine follow-up. The patient has been doing well. She becomes short of breath only in hot and humid weather but has air-conditioning at home. No shortness of breath in air-conditioning. She has not been able to exercise because of the heat and humidity. Compliant with budesonide and Perforomist twice daily. Takes Duonebs 2-3 times a week. Occasional dry cough. Occasional wheezing only when walking in hot humid weather, which she does when her dog needs to go out. No hospitalizations  or ER visits, or COPD exacerbation in 6 months. Nasal allergies have slightly worsened since she has been unable to continue with immunotherapy. Dr. Gutierrez moved out of his [place] office. The patient needs a new allergist.     Provider Impressions     1. Moderate COPD/asthma, controlled. Breathing worse in heat and humidity only. But tends to worsen seasonally in the spring and fall   - Continue with budesonide nebulization and Perforomist nebulization twice daily. Take DuoNeb as needed. May take budesonide 3 times daily in the spring and fall if symptoms worsen. Advised patient to try to walk more. Aim for 20-30 minutes of walking daily. Stay in air-conditioning in hot and humid weather.  2. Allergic rhinitis, slightly worse since she has not been able to receive immunotherapy. Her allergist moved.   - Continue with fluticasone nasal spray, 2 sprays in each nostril daily. Continue with montelukast 10 mg at bedtime. Cetirizine 10 mg (generic Zyrtec) at bedtime. The patient will try to resume immunotherapy with Dr. Kennedy of allergy in [place].  3. Mild pulmonary hypertension, stable  REVIEW OF SYSTEMS     REVIEW OF SYSTEMS  Review of Systems   Constitutional:  Positive for fatigue. Negative for activity change, appetite change, chills, fever and unexpected weight change.   HENT:  Positive for congestion and rhinorrhea. Negative for postnasal drip, sinus pressure, sinus pain, sneezing, sore throat, trouble swallowing and voice change.         Denies throat clearing   Eyes:  Negative for redness and itching.   Respiratory:  Positive for cough. Negative for chest tightness, shortness of breath, wheezing and stridor.    Cardiovascular:  Negative for chest pain, palpitations and leg swelling.        Denies orthopnea   Gastrointestinal:  Negative for abdominal pain, diarrhea, nausea and vomiting.        +acid reflux   Musculoskeletal:  Positive for arthralgias. Negative for back pain, joint swelling and myalgias.    Skin:  Negative for rash.   Allergic/Immunologic: Negative for immunocompromised state.   Neurological:  Positive for dizziness and tremors. Negative for weakness and headaches.   Hematological:  Does not bruise/bleed easily.   Psychiatric/Behavioral:  Negative for agitation and sleep disturbance. The patient is not nervous/anxious.         Denies depression   All other systems reviewed and are negative.        ALLERGIES AND MEDICATIONS     ALLERGIES  No Known Allergies    MEDICATIONS  Current Outpatient Medications   Medication Sig Dispense Refill    albuterol 2.5 mg /3 mL (0.083 %) nebulizer solution Take 3 mL (2.5 mg) by nebulization.      albuterol 90 mcg/actuation aerosol [facility] breath activated inhaler Inhale 2 puffs every 6 hours if needed for shortness of breath.      cetirizine (ZyrTEC) 10 mg tablet Take 1 tablet (10 mg) by mouth once daily.      citalopram (CeleXA) 20 mg tablet Take 1 tablet (20 mg) by mouth once daily.      ergocalciferol (Vitamin D-2) 1.25 MG (38977 UT) capsule Take 1 capsule (1,250 mcg) by mouth 1 (one) time per week.      levothyroxine (Synthroid) 112 mcg tablet Take 1 tablet (112 mcg) by mouth once daily in the morning. Take before meals.      montelukast (Singulair) 10 mg tablet Take 1 tablet (10 mg) by mouth once daily at bedtime.      fluticasone-umeclidin-vilanter (Trelegy Ellipta) 100-62.5-25 mcg blister with device Inhale 1 puff once daily. Rinse mouth with water after use to reduce aftertaste and incidence of candidiasis. Do not swallow. 60 each 5    oxygen (O2) gas therapy Inhale 2 L/min once every 24 hours. (Patient not taking: Reported on 4/29/2024)       No current facility-administered medications for this visit.       PAST HISTORY     PAST MEDICAL HISTORY  She  has a past medical history of Arthritis, COPD (chronic obstructive pulmonary disease) (Multi), Disease of thyroid gland, and Personal history of other diseases of the respiratory system (01/13/2015).    PAST  "SURGICAL HISTORY  Past Surgical History:   Procedure Laterality Date    APPENDECTOMY  09/20/2013    Appendectomy    CHOLECYSTECTOMY  09/16/2013    Cholecystectomy Laparoscopic    DILATION AND CURETTAGE OF UTERUS  09/20/2013    Dilation And Curettage Of Cervical Stump    INCISION AND DRAINAGE OF WOUND  09/20/2013    Incision And Drainage Of Skin Abscess    OTHER SURGICAL HISTORY  09/20/2013    Ovarian Cystectomy    TONSILLECTOMY  09/20/2013    Tonsillectomy       IMMUNIZATION HISTORY  Immunization History   Administered Date(s) Administered    Moderna SARS-CoV-2 Vaccination 02/12/2021, 03/15/2021, 12/03/2021    Pfizer COVID-19 vaccine, bivalent, age 12 years and older (30 mcg/0.3 mL) 10/12/2022       SOCIAL HISTORY  She  reports that she quit smoking about 32 years ago. Her smoking use included cigarettes. She started smoking about 65 years ago. She has a 66 pack-year smoking history. She has never used smokeless tobacco. She reports that she does not currently use alcohol. She reports that she does not currently use drugs.     OCCUPATIONAL/ENVIRONMENTAL HISTORY  Previously worked as: . 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 Lots of fumes and dusts.  Currently works as: retired  DOES/DOES NOT: does not have known exposure to asbestos, silica, beryllium or inhaled metals.  DOES/DOES NOT: does have exposure to birds or exotic animals.  - Pet parakeet in childhood    FAMILY HISTORY  Family History   Problem Relation Name Age of Onset    Heart failure Mother       DOES/DOES NOT: does not have a family history of pulmonary disease.  DOES/DOES NOT: does have a family history of cancer.  -Daughter; SCLC (smoker)  DOES/DOES NOT: does not have a family history of autoimmune disorders.    PHYSICAL EXAM     VITAL SIGNS: /76   Pulse 59   Ht 1.702 m (5' 7\")   Wt 91.6 kg (202 lb)   SpO2 94%   BMI 31.64 kg/m²      PREVIOUS WEIGHTS:  Wt Readings from Last 3 Encounters:   04/29/24 91.6 kg (202 lb) "   24 92.1 kg (203 lb)   23 94.3 kg (207 lb 14.3 oz)       Physical Exam  Vitals reviewed.   Constitutional:       General: She is not in acute distress.     Appearance: Normal appearance. She is not ill-appearing or toxic-appearing.   HENT:      Head: Normocephalic.      Nose: No rhinorrhea.   Cardiovascular:      Rate and Rhythm: Normal rate and regular rhythm.      Heart sounds: Normal heart sounds.   Pulmonary:      Effort: Pulmonary effort is normal. No respiratory distress.      Breath sounds: Normal breath sounds. No stridor. No wheezing, rhonchi or rales.   Abdominal:      General: Abdomen is flat.   Musculoskeletal:         General: Normal range of motion.      Right lower leg: No edema.      Left lower leg: No edema.   Skin:     General: Skin is warm and dry.      Nails: There is no clubbing.   Neurological:      General: No focal deficit present.      Mental Status: She is alert and oriented to person, place, and time.   Psychiatric:         Mood and Affect: Mood normal.         Behavior: Behavior normal.         Judgment: Judgment normal.       RESULTS/DATA     Pulmonary Function Test Results   PFT  24: FEV1/FVC: 41, FEV1: 1.01 (46%), FVC: 2.50 (86%), ++BD response, NOM90-29: 16% (187% change with BD admin), T.18 (112%), RV/T%, DLCO: 69%  11: FEV1/FVC: 53, FEV1: 1.46 (57%), FVC: 2.78 (86%), +BD response, BDF70-15: 21%, T.63 (104%), RV/T%, DLCO: 62%    6MWT  24: 258 m. SpO2 addy 91% on room air. Max HR: 89.    Chest Radiograph     XR chest 1 view 2023    Narrative  INDICATION:  Shortness of breath.  COMPARISON:  None available  TECHNIQUE: AP chest 22:41 hours (two images).  FINDINGS:  The heart and mediastinum are normal.  The lungs are without  infiltrates, masses or pulmonary vascular congestion.  No pneumothorax  or pleural effusion are seen.  No acute osseous changes.    Impression  Normal AP chest.  Signed by Ernesto Cho MD      Chest CT  Scan     No results found for this or any previous visit from the past 365 days.      Echocardiogram & Cardiac Studies     No results found for this or any previous visit from the past 365 days.       Labwork & Pathology   Complete Blood Count  Lab Results   Component Value Date    WBC 7.2 01/30/2024    HGB 13.9 01/30/2024    HCT 43.9 01/30/2024    MCV 98 01/30/2024     (L) 01/30/2024       Peripheral Eosinophil Count:   Eosinophils Absolute (x10*3/uL)   Date Value   01/30/2024 0.18   11/07/2023 0.00   11/06/2023 0.00     Labs  1/30/24: Eosinophils: 180  1/30/24: Immunocap IgE: 8    Metabolic Parameters  Sodium   Date/Time Value Ref Range Status   11/08/2023 06:20  136 - 145 mmol/L Final     Potassium   Date/Time Value Ref Range Status   11/08/2023 06:20 AM 4.6 3.5 - 5.3 mmol/L Final     Chloride   Date/Time Value Ref Range Status   11/08/2023 06:20  98 - 107 mmol/L Final     Bicarbonate   Date/Time Value Ref Range Status   11/08/2023 06:20 AM 29 21 - 32 mmol/L Final     Anion Gap   Date/Time Value Ref Range Status   11/08/2023 06:20 AM 13 10 - 20 mmol/L Final     Urea Nitrogen   Date/Time Value Ref Range Status   11/08/2023 06:20 AM 21 6 - 23 mg/dL Final     Creatinine   Date/Time Value Ref Range Status   11/08/2023 06:20 AM 0.88 0.50 - 1.05 mg/dL Final     Glucose   Date/Time Value Ref Range Status   11/08/2023 06:20  (H) 74 - 99 mg/dL Final     Calcium   Date/Time Value Ref Range Status   11/08/2023 06:20 AM 8.9 8.6 - 10.3 mg/dL Final     AST   Date/Time Value Ref Range Status   11/07/2023 06:22 AM 24 9 - 39 U/L Final     ALT   Date/Time Value Ref Range Status   11/07/2023 06:22 AM 22 7 - 45 U/L Final     Comment:     Patients treated with Sulfasalazine may generate falsely decreased results for ALT.       Coagulation Parameters  Protime   Date/Time Value Ref Range Status   11/04/2023 10:37 PM 11.1 9.8 - 12.8 seconds Final     INR   Date/Time Value Ref Range Status   11/04/2023 10:37 PM  "1.0 0.9 - 1.1 Final       Serum Immunoglobulin E:    No results found for: \"IGE\"     Bronchoscopy & Sputum Cultures       ASSESSMENT/PLAN     Ms. Harris is a 78 y.o. female; was referred to the [facility] for evaluation of Asthma-COPD overlap syndrome    Problem List and Orders  Diagnoses and all orders for this visit:  Asthma-COPD overlap syndrome (Multi)  -     fluticasone-umeclidin-vilanter (Trelegy Ellipta) 100-62.5-25 mcg blister with device; Inhale 1 puff once daily. Rinse mouth with water after use to reduce aftertaste and incidence of candidiasis. Do not swallow.  Chronic allergic rhinitis  Former cigarette smoker        Assessment and Plan / Recommendations:  Asthma-COPD Overlap: States she was diagnosed with asthma back in the 1970's. Has also dealt with allergy issues all her life. Thinks her breathing got worse around ~2011 when she completed a PFT study which at that time; showed moderate obstruction with +BD response, small airways reactivity and air trapping with moderately reduced DLCO. Was told by Dr. Morin that she has both Asthma w/ COPD. Does not appear to have PFT testing since. For a number of years, has been on nebulized budesonide and Perforomist as her maintenance therapy. Has never been on actual maintenance inhalers before. Will use Albuterol HFA and nebs PRN. Leading up to recent hospitalization in 11/2023; she had been out of all her nebulized medications for some time. Reports having NAIR, chronic cough with clear mucous, frequent wheezing. 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  6-minute walk from 2/16/2024 showed an SpO2 addy of 91% on room air.  Serum eosinophil count from 1/30/2024 was mildly increased at 180 and ImmunoCAP IgE level was normal at 8.   - Continue Trelegy 100; 1 inhalation once a day  - Continue Albuterol HFA and nebs " PRN  -Since starting the Trelegy her symptoms have significantly improved and is feeling much better      2. Former Cigarette Smoker: Smoked upwards of 2 ppd x 33 years. Quit in 1992. 66 total pack years.  - Does not qualify for lung cancer screening protocol given her age and length of quit    3. Chronic Allergies: Reports having allergy issues for decades and has been on allergy shots for significant period of time.  Formerly managed by Dr. Kennedy.  She is in need of a new allergist.  Has been off allergy shots since September 2023.  - Allergy referral placed; suggested Dr. Gutierrez  -Continue Zyrtec and montelukast    RTC 6 months

## 2024-04-29 ENCOUNTER — OFFICE VISIT (OUTPATIENT)
Dept: PULMONOLOGY | Facility: CLINIC | Age: 79
End: 2024-04-29
Payer: MEDICARE

## 2024-04-29 VITALS
DIASTOLIC BLOOD PRESSURE: 76 MMHG | HEIGHT: 67 IN | HEART RATE: 59 BPM | BODY MASS INDEX: 31.71 KG/M2 | OXYGEN SATURATION: 94 % | WEIGHT: 202 LBS | SYSTOLIC BLOOD PRESSURE: 135 MMHG

## 2024-04-29 DIAGNOSIS — J44.89 ASTHMA-COPD OVERLAP SYNDROME (MULTI): Primary | ICD-10-CM

## 2024-04-29 DIAGNOSIS — J30.9 CHRONIC ALLERGIC RHINITIS: ICD-10-CM

## 2024-04-29 DIAGNOSIS — Z87.891 FORMER CIGARETTE SMOKER: ICD-10-CM

## 2024-04-29 PROCEDURE — 99214 OFFICE O/P EST MOD 30 MIN: CPT | Performed by: NURSE PRACTITIONER

## 2024-04-29 PROCEDURE — 1159F MED LIST DOCD IN RCRD: CPT | Performed by: NURSE PRACTITIONER

## 2024-04-29 PROCEDURE — 1160F RVW MEDS BY RX/DR IN RCRD: CPT | Performed by: NURSE PRACTITIONER

## 2024-04-29 PROCEDURE — 1126F AMNT PAIN NOTED NONE PRSNT: CPT | Performed by: NURSE PRACTITIONER

## 2024-04-29 PROCEDURE — 1036F TOBACCO NON-USER: CPT | Performed by: NURSE PRACTITIONER

## 2024-04-29 RX ORDER — FLUTICASONE FUROATE, UMECLIDINIUM BROMIDE AND VILANTEROL TRIFENATATE 100; 62.5; 25 UG/1; UG/1; UG/1
1 POWDER RESPIRATORY (INHALATION) DAILY
Qty: 60 EACH | Refills: 5 | Status: SHIPPED | OUTPATIENT
Start: 2024-04-29

## 2024-04-29 ASSESSMENT — ENCOUNTER SYMPTOMS
SINUS PRESSURE: 0
BRUISES/BLEEDS EASILY: 0
ARTHRALGIAS: 1
DIARRHEA: 0
SORE THROAT: 0
FEVER: 0
STRIDOR: 0
TREMORS: 1
EYE ITCHING: 0
RHINORRHEA: 1
SLEEP DISTURBANCE: 0
WEAKNESS: 0
CHEST TIGHTNESS: 0
JOINT SWELLING: 0
BACK PAIN: 0
MYALGIAS: 0
ROS GI COMMENTS: +ACID REFLUX
NERVOUS/ANXIOUS: 0
UNEXPECTED WEIGHT CHANGE: 0
AGITATION: 0
SINUS PAIN: 0
ACTIVITY CHANGE: 0
CHILLS: 0
DIZZINESS: 1
VOICE CHANGE: 0
FATIGUE: 1
APPETITE CHANGE: 0
SHORTNESS OF BREATH: 0
NAUSEA: 0
HEADACHES: 0
EYE REDNESS: 0
ABDOMINAL PAIN: 0
VOMITING: 0
TROUBLE SWALLOWING: 0
COUGH: 1
WHEEZING: 0
PALPITATIONS: 0

## 2024-04-29 ASSESSMENT — PAIN SCALES - GENERAL: PAINLEVEL: 0-NO PAIN

## 2024-04-29 NOTE — PATIENT INSTRUCTIONS
Today we discussed your recent testing and how you have been feeling. Happy to hear things are much better.    -Continue Trelegy 100; 1 inhalation once a day.  Rinse mouth after use.  This will be your long-acting daily maintenance inhaler.   -Continue albuterol inhaler/nebulizer as needed for shortness of breath  -I placed a referral for you to get established with a new allergist (maybe try seeing if you can get in with Dr. Gutierrez)  -Continue Zyrtec and Singulair    Thank you for visiting the pulmonary clinic today! It was a pleasure to participate in your care.  Please return to clinic 6 months or sooner if needed.    Davey Parrish, CNP  My Office Number: [phone]   CT Scheduling: [phone]  PFT/Follow Up Visit Scheduling: [phone]  My Nurse: MARLENY Walters  My : [place]    **For immediate needs such as medication issues/refills, active sick symptoms/medical concerns; I ask that you please call the office and speak to the pulmonary nurse. MyChart messages do not come directly to me. There can sometimes be a delay before I am aware of any messages that were sent. Thank you.**

## 2024-05-17 ENCOUNTER — HOSPITAL ENCOUNTER (OUTPATIENT)
Dept: RADIOLOGY | Facility: HOSPITAL | Age: 79
Discharge: HOME | End: 2024-05-17
Payer: MEDICARE

## 2024-05-17 VITALS — WEIGHT: 200 LBS | HEIGHT: 67 IN | BODY MASS INDEX: 31.39 KG/M2

## 2024-05-17 DIAGNOSIS — R92.8 OTHER ABNORMAL AND INCONCLUSIVE FINDINGS ON DIAGNOSTIC IMAGING OF BREAST: ICD-10-CM

## 2024-05-17 PROCEDURE — 77065 DX MAMMO INCL CAD UNI: CPT | Mod: LEFT SIDE | Performed by: RADIOLOGY

## 2024-05-17 PROCEDURE — G0279 TOMOSYNTHESIS, MAMMO: HCPCS | Mod: LEFT SIDE | Performed by: RADIOLOGY

## 2024-05-17 PROCEDURE — 77061 BREAST TOMOSYNTHESIS UNI: CPT | Mod: LT

## 2024-09-25 ENCOUNTER — APPOINTMENT (OUTPATIENT)
Dept: ALLERGY | Facility: CLINIC | Age: 79
End: 2024-09-25
Payer: MEDICARE

## 2024-09-25 DIAGNOSIS — J30.89 ALLERGIC RHINITIS DUE TO OTHER ALLERGIC TRIGGER, UNSPECIFIED SEASONALITY: Primary | ICD-10-CM

## 2024-09-25 DIAGNOSIS — H10.45 CHRONIC ALLERGIC CONJUNCTIVITIS: ICD-10-CM

## 2024-09-25 DIAGNOSIS — J44.89 ASTHMA-COPD OVERLAP SYNDROME (MULTI): ICD-10-CM

## 2024-09-25 PROCEDURE — 95004 PERQ TESTS W/ALRGNC XTRCS: CPT | Performed by: ALLERGY & IMMUNOLOGY

## 2024-09-25 PROCEDURE — 99204 OFFICE O/P NEW MOD 45 MIN: CPT | Performed by: ALLERGY & IMMUNOLOGY

## 2024-09-25 RX ORDER — AZELASTINE 1 MG/ML
2 SPRAY, METERED NASAL 2 TIMES DAILY
Qty: 30 ML | Refills: 11 | Status: SHIPPED | OUTPATIENT
Start: 2024-09-25 | End: 2025-09-25

## 2024-09-25 NOTE — PROGRESS NOTES
"Subjective   Patient ID:   [id number]   Margaux Harris is a 78 y.o. female who presents for Allergic Rhinitis.    Chief Complaint   Patient presents with    Allergic Rhinitis          HPI  This patient is here to evaluate for:  Allergic rhinitis and conjunctivitis     Has been getting allergy shots.   She was on shots since the 1970's.  Stopped for a time.   Then started again in [place] and restarted.  Then saw Dr. Kennedy for over 3 years and received shots.     Her last shot was 1 year ago. Then saw Louann Cooper and got shots but briefly since 2 months later, that office closed.   She was on monthly shots.    \"This has been the worse summer ever\" off the shots.    She is having sneezing, coughing, mucus but that is also from copd.     She is on Trelegy, montelukast at bedtime, prednisone 10mg prn for copd.  Zyrtec at bedtime   Has flonase also     She also has COPD and sees Davey Parrish who has her on Trelegy.      Review of Systems   All other systems reviewed and are negative.        Objective     There were no vitals taken for this visit.     Physical Exam  Constitutional:       General: She is not in acute distress.     Appearance: Normal appearance. She is not ill-appearing.   HENT:      Head: Normocephalic and atraumatic.      Right Ear: Tympanic membrane, ear canal and external ear normal.      Left Ear: Tympanic membrane, ear canal and external ear normal.      Nose: Nose normal. No congestion or rhinorrhea.      Mouth/Throat:      Mouth: Mucous membranes are moist.      Pharynx: Oropharynx is clear. No oropharyngeal exudate or posterior oropharyngeal erythema.   Eyes:      General:         Right eye: No discharge.         Left eye: No discharge.      Conjunctiva/sclera: Conjunctivae normal.   Cardiovascular:      Rate and Rhythm: Normal rate and regular rhythm.      Heart sounds: Normal heart sounds. No murmur heard.     No friction rub. No gallop.   Pulmonary:      Effort: Pulmonary effort is normal. No " respiratory distress.      Breath sounds: Normal breath sounds. No stridor. No wheezing, rhonchi or rales.   Chest:      Chest wall: No tenderness.   Abdominal:      General: Abdomen is flat.      Palpations: Abdomen is soft.   Musculoskeletal:         General: Normal range of motion.      Cervical back: Normal range of motion and neck supple.   Lymphadenopathy:      Cervical: No cervical adenopathy.   Skin:     General: Skin is warm and dry.      Findings: No erythema, lesion or rash.   Neurological:      General: No focal deficit present.      Mental Status: She is alert. Mental status is at baseline.   Psychiatric:         Mood and Affect: Mood normal.         Behavior: Behavior normal.         Thought Content: Thought content normal.         Judgment: Judgment normal.            Current Outpatient Medications   Medication Sig Dispense Refill    albuterol 2.5 mg /3 mL (0.083 %) nebulizer solution Take 3 mL (2.5 mg) by nebulization.      albuterol 90 mcg/actuation aerosol powdr breath activated inhaler Inhale 2 puffs every 6 hours if needed for shortness of breath.      cetirizine (ZyrTEC) 10 mg tablet Take 1 tablet (10 mg) by mouth once daily.      citalopram (CeleXA) 20 mg tablet Take 1 tablet (20 mg) by mouth once daily.      ergocalciferol (Vitamin D-2) 1.25 MG (05744 UT) capsule Take 1 capsule (1,250 mcg) by mouth 1 (one) time per week.      fluticasone-umeclidin-vilanter (Trelegy Ellipta) 100-62.5-25 mcg blister with device Inhale 1 puff once daily. Rinse mouth with water after use to reduce aftertaste and incidence of candidiasis. Do not swallow. 60 each 5    levothyroxine (Synthroid) 112 mcg tablet Take 1 tablet (112 mcg) by mouth once daily in the morning. Take before meals.      montelukast (Singulair) 10 mg tablet Take 1 tablet (10 mg) by mouth once daily at bedtime.      oxygen (O2) gas therapy Inhale 2 L/min once every 24 hours. (Patient not taking: Reported on 4/29/2024)       No current  facility-administered medications for this visit.       Summary of the labs over the past 6 months:    No visits with results within 6 Month(s) from this visit.   Latest known visit with results is:   Hospital Outpatient Visit on 02/16/2024   Component Date Value Ref Range Status    FVC - Predicted 02/16/2024 2.87   Final    FEV1 - Predicted 02/16/2024 2.16   Final    FVC - PRE 02/16/2024 2.50   Final    FEV1 - Pre 02/16/2024 1.01   Final    FVC - Post 02/16/2024 3.15   Final    FEV1 - Post 02/16/2024 1.48   Final         Assessment/Plan   Diagnoses and all orders for this visit:  Allergic rhinitis due to other allergic trigger, unspecified seasonality  Chronic allergic conjunctivitis  Asthma-COPD overlap syndrome (Multi)    Wear a mask with mowing grass  Use a nasal saline     Cetirizine 10mg daily - change this the morning  Montelukast at bedtime   Start azelastine nasal.  Ok to use flonase over the counter as well.     Continue the asthma-copd treatment per your Pulmonary provider.     Based on your testing, and history, you have completed a good course of allergy shots so it should be ok to stop them for now.     I would be happy to see you again as needed. Please feel free to contact my office to schedule a follow-up with our office at [phone].  If your next summer is bad, despite the medications, please call.       Javier Gutierrez MD

## 2024-10-17 ASSESSMENT — ENCOUNTER SYMPTOMS
FATIGUE: 1
SINUS PRESSURE: 0
BACK PAIN: 0
SHORTNESS OF BREATH: 0
SORE THROAT: 0
MYALGIAS: 0
SLEEP DISTURBANCE: 0
DIZZINESS: 1
FEVER: 0
ABDOMINAL PAIN: 0
STRIDOR: 0
VOMITING: 0
NAUSEA: 0
ARTHRALGIAS: 1
VOICE CHANGE: 0
BRUISES/BLEEDS EASILY: 0
APPETITE CHANGE: 0
SINUS PAIN: 0
EYE REDNESS: 0
WEAKNESS: 0
ROS GI COMMENTS: +ACID REFLUX
WHEEZING: 0
HEADACHES: 0
ACTIVITY CHANGE: 0
COUGH: 1
UNEXPECTED WEIGHT CHANGE: 0
TROUBLE SWALLOWING: 0
DIARRHEA: 0
CHEST TIGHTNESS: 0
TREMORS: 1
JOINT SWELLING: 0
PALPITATIONS: 0
EYE ITCHING: 0
RHINORRHEA: 1
AGITATION: 0
NERVOUS/ANXIOUS: 0
CHILLS: 0

## 2024-10-17 NOTE — PROGRESS NOTES
Patient: Margaux Harris    [id number]  : 1945 -- AGE 78 y.o.    Provider: MADHAVI Townsend     Location [facility]   Service Date: 10/22/2024       Department of Medicine  Division of Pulmonary, Critical Care, and Sleep Medicine       [facility]  Follow Up Visit Note    HISTORY OF PRESENT ILLNESS     PCP: Claritza Quevedo PA-C    HISTORY OF PRESENT ILLNESS   Margaux Harris is a 78 y.o. female who presents to a [facility] for a follow up visit with concerns of COPD.   I have independently interviewed and examined the patient in the office and reviewed available records.    DATE OF LAST VISIT: 2024    Current History    On today's visit, She reports her breathing has been very stable and is very pleased with how Trelegy 100 is working for her.  Seldom ever requires albuterol HFA and will typically do a DuoNeb treatment maybe a couple times a week.  Reports an occasional cough which is typically worse in the morning upon waking secondary to sinus drainage that has collected over the night.  Seldom ever experiences wheezing. Will periodically check her SpO2 levels at home; typically ranging 92-95% on room air. She has a home oxygen concentrator at her house. I explained that as long as she is above 88%, she does not need to wear any oxygen.  She is now currently established with allergist Dr. Gutierrez; he is managing her chronic sinus/allergy issues.  No new pulmonary concerns at this time.  CAT: 13  ACT: 15    Prior Visits & History   2024: Since last visit, patient completed previously ordered testing.  Reviewed all results with her today.  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  6-minute walk from 2024 showed an SpO2 addy of 91% on room air.  Serum eosinophil count from 2024  was mildly increased at 180 and ImmunoCAP IgE level was normal at 8.  Last visit I had started her on Trelegy 100.    On today's visit, She reports since being on the Trelegy 100 she has felt significantly improved and her breathing has never been better.  Seldom ever feels like she needs her albuterol treatments now.  She did recently have a flaring of her COPD in mid April in which I treated her with Augmentin and prednisone; her symptoms improved with this course of treatment.  Otherwise, she has not had any issues with her breathing and is much happier on the Trelegy inhaler.  The cough and mucus is noticeably improved.  No notable wheezing.  Still waiting to get established with a new allergist; I did suggest Dr. Javier Gutierrez.  She told me that a few weeks ago she had car problems and was forced to walk a longer distance and was able to walk 0.5-0.75 of a mile without having to stop.  Was very happy about this.  CAT Today: 11  ACT Today: 20    01/30/2024: Patient had a recent hospitalization from 11/5/2023 through 11/8/2023 for COPD exacerbation.  Prior to admission she had become feeling progressively more short of breath over the past 1-1/2 weeks.    Was treated with Solu-Medrol and transition to present and also treated with antibiotics.  Respiratory failure requiring oxygen and was discharged home on 2 L nasal cannula.    She is a prior patient of Dr. Morin for pulmonary care; last seen 8/1/2019.  Upon intake, she states she was seeing a private practice pulmonologist in between Dr. Morin and [place]; unknown who this is.    On today's visit, the patient reports she is feeling better since discharge. States she was diagnosed with asthma around 1970. Formerly saw Dr. Kennedy for allergy care. Has been off allergy shots since September.   States that she had ran out of her inhaler/neb therapy for a period of time and was out of her medications leading up to her recent hospitalization. Uses budesonide nebulizer  "BID daily. Uses Perforomist nebulizers daily.   States she has a hx of asthma dating back many years. Had bronchial issues in childhood. Denies SOB at rest. Does have NAIR; going up and down stairs is most noticeable. The NAIR has been present since ~2011.   Symptoms have gotten progressively worse since then. Does report a chronic cough; productive. Clear. Reports regular wheezing. Denies orthopnea. Denies lower leg swelling.   Intermittent sinusitis issues (takes Zyrtec and Singulair daily). Has been on allergy shots for \"years\". Occasional GERD symptoms; will use PRN Tums or use peppermints.   No CP, palpitations. Lost 10 lbs when sick in the hospital; otherwise weight is stable. No recent fever, chills, sweats. Was sent home with oxygen from the hospital. States she wore it briefly after hospital discharge.   Wore for a couple days but then took herself off of it. Was advised to use on exertion. Will check her SpO2 level at home; typically will run 93-96% on room air sitting at rest. After exertion, will go as low as 90%.    No premature birth. Was told she had bronchitis at birth. Had bronchial issues growing up. No other pulm admissions besides what was already mentioned.    No snoring. No waking up choking/gasping. No daytime fatigue. No AM headaches.     CAT Today: 25  ACT Today: 12  ESS Today: 5    Hospital Course 11/5/23 - 11/8/23  Margaux Harris is an 78 y.o. female with history of COPD presenting with SOB. She reports being in her USOH until 1-1.5 weeks ago when she became progressively SOB.   She did not have her respiratory for at least a month as her established pulmonologist was out of the office. She was admitted with COPD exacerbation and acute Respiratory Failure requiring supplemental oxygen.   She was treated with solumedrol and later transitioned to prednisone. She was also treated with 3 days of doxycycline BID.     RT recommended sending the patient home on 2L/NC supplemental oxygen and that was " sent up for discharge home. Her established pulmonologist has returned, so she will call her to schedule an outpatient follow up. Meds sent to our outpatient pharmacy.     Dr. Morin Note 8/1/19  History of Present Illness  73-year-old  woman with moderate COPD/asthma, allergic rhinitis and mild pulmonary hypertension presents for routine follow-up. The patient has been doing well. She becomes short of breath only in hot and humid weather but has air-conditioning at home. No shortness of breath in air-conditioning. She has not been able to exercise because of the heat and humidity. Compliant with budesonide and Perforomist twice daily. Takes Duonebs 2-3 times a week. Occasional dry cough. Occasional wheezing only when walking in hot humid weather, which she does when her dog needs to go out. No hospitalizations or ER visits, or COPD exacerbation in 6 months. Nasal allergies have slightly worsened since she has been unable to continue with immunotherapy. Dr. Gutierrze moved out of his [place] office. The patient needs a new allergist.     Provider Impressions     1. Moderate COPD/asthma, controlled. Breathing worse in heat and humidity only. But tends to worsen seasonally in the spring and fall   - Continue with budesonide nebulization and Perforomist nebulization twice daily. Take DuoNeb as needed. May take budesonide 3 times daily in the spring and fall if symptoms worsen. Advised patient to try to walk more. Aim for 20-30 minutes of walking daily. Stay in air-conditioning in hot and humid weather.  2. Allergic rhinitis, slightly worse since she has not been able to receive immunotherapy. Her allergist moved.   - Continue with fluticasone nasal spray, 2 sprays in each nostril daily. Continue with montelukast 10 mg at bedtime. Cetirizine 10 mg (generic Zyrtec) at bedtime. The patient will try to resume immunotherapy with Dr. Kennedy of allergy in [place].  3. Mild pulmonary hypertension, stable    REVIEW OF  SYSTEMS     REVIEW OF SYSTEMS  Review of Systems   Constitutional:  Positive for fatigue. Negative for activity change, appetite change, chills, fever and unexpected weight change.   HENT:  Positive for congestion and rhinorrhea. Negative for postnasal drip, sinus pressure, sinus pain, sneezing, sore throat, trouble swallowing and voice change.         Denies throat clearing   Eyes:  Negative for redness and itching.   Respiratory:  Positive for cough. Negative for chest tightness, shortness of breath, wheezing and stridor.    Cardiovascular:  Negative for chest pain, palpitations and leg swelling.        Denies orthopnea   Gastrointestinal:  Negative for abdominal pain, diarrhea, nausea and vomiting.        +acid reflux   Musculoskeletal:  Positive for arthralgias. Negative for back pain, joint swelling and myalgias.   Skin:  Negative for rash.   Allergic/Immunologic: Negative for immunocompromised state.   Neurological:  Positive for dizziness and tremors. Negative for weakness and headaches.   Hematological:  Does not bruise/bleed easily.   Psychiatric/Behavioral:  Negative for agitation and sleep disturbance. The patient is not nervous/anxious.         Denies depression   All other systems reviewed and are negative.        ALLERGIES AND MEDICATIONS     ALLERGIES  No Known Allergies    MEDICATIONS  Current Outpatient Medications   Medication Sig Dispense Refill    azelastine (Astelin) 137 mcg (0.1 %) nasal spray Administer 2 sprays into each nostril 2 times a day. Use in each nostril as directed 30 mL 11    cetirizine (ZyrTEC) 10 mg tablet Take 1 tablet (10 mg) by mouth once daily.      citalopram (CeleXA) 20 mg tablet Take 1 tablet (20 mg) by mouth once daily.      levothyroxine (Synthroid) 112 mcg tablet Take 1 tablet (112 mcg) by mouth once daily in the morning. Take before meals.      montelukast (Singulair) 10 mg tablet Take 1 tablet (10 mg) by mouth once daily at bedtime.      oxygen (O2) gas therapy Inhale  2 L/min once every 24 hours. (Patient taking differently: Inhale 2 L/min once every 24 hours. Uses her dads old concentrator sometimes)      albuterol 90 mcg/actuation inhaler Inhale 2 puffs every 4 hours if needed for wheezing or shortness of breath (Can use 10-15 minutes prior to exertional activity as needed). 18 g 5    ergocalciferol (Vitamin D-2) 1.25 MG (62739 UT) capsule Take 1 capsule (1,250 mcg) by mouth 1 (one) time per week. (Patient not taking: Reported on 10/22/2024)      fluticasone-umeclidin-vilanter (Trelegy Ellipta) 100-62.5-25 mcg blister with device Inhale 1 puff once daily. Rinse mouth with water after use to reduce aftertaste and incidence of candidiasis. Do not swallow. 60 each 5    ipratropium-albuteroL (Duo-Neb) 0.5-2.5 mg/3 mL nebulizer solution Take 3 mL by nebulization every 6 hours if needed for shortness of breath. 360 mL 5     No current facility-administered medications for this visit.       PAST HISTORY     PAST MEDICAL HISTORY  She  has a past medical history of Arthritis, COPD (chronic obstructive pulmonary disease) (Multi), Disease of thyroid gland, and Personal history of other diseases of the respiratory system (01/13/2015).    PAST SURGICAL HISTORY  Past Surgical History:   Procedure Laterality Date    APPENDECTOMY  09/20/2013    Appendectomy    CHOLECYSTECTOMY  09/16/2013    Cholecystectomy Laparoscopic    DILATION AND CURETTAGE OF UTERUS  09/20/2013    Dilation And Curettage Of Cervical Stump    INCISION AND DRAINAGE OF WOUND  09/20/2013    Incision And Drainage Of Skin Abscess    OTHER SURGICAL HISTORY  09/20/2013    Ovarian Cystectomy    TONSILLECTOMY  09/20/2013    Tonsillectomy       IMMUNIZATION HISTORY  Immunization History   Administered Date(s) Administered    Moderna SARS-CoV-2 Vaccination 02/12/2021, 03/15/2021, 12/03/2021    Pfizer COVID-19 vaccine, bivalent, age 12 years and older (30 mcg/0.3 mL) 10/12/2022       SOCIAL HISTORY  She  reports that she quit smoking  "about 32 years ago. Her smoking use included cigarettes. She started smoking about 65 years ago. She has a 66 pack-year smoking history. She has been exposed to tobacco smoke. She has never used smokeless tobacco. She reports that she does not currently use alcohol. She reports that she does not currently use drugs.     OCCUPATIONAL/ENVIRONMENTAL HISTORY  Previously worked as: . 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 Lots of fumes and dusts.  Currently works as: retired  DOES/DOES NOT: does not have known exposure to asbestos, silica, beryllium or inhaled metals.  DOES/DOES NOT: does have exposure to birds or exotic animals.  - Pet parakeet in childhood    FAMILY HISTORY  Family History   Problem Relation Name Age of Onset    Heart failure Mother       DOES/DOES NOT: does not have a family history of pulmonary disease.  DOES/DOES NOT: does have a family history of cancer.  -Daughter; SCLC (smoker)  DOES/DOES NOT: does not have a family history of autoimmune disorders.    PHYSICAL EXAM     VITAL SIGNS: /75   Pulse 57   Ht 1.702 m (5' 7\")   Wt 88 kg (194 lb)   SpO2 94%   BMI 30.38 kg/m²      PREVIOUS WEIGHTS:  Wt Readings from Last 3 Encounters:   10/22/24 88 kg (194 lb)   05/17/24 90.7 kg (200 lb)   04/29/24 91.6 kg (202 lb)       Physical Exam  Vitals reviewed.   Constitutional:       General: She is not in acute distress.     Appearance: Normal appearance. She is not ill-appearing or toxic-appearing.   HENT:      Head: Normocephalic.      Nose: No rhinorrhea.   Cardiovascular:      Rate and Rhythm: Normal rate and regular rhythm.      Heart sounds: Normal heart sounds.   Pulmonary:      Effort: Pulmonary effort is normal. No respiratory distress.      Breath sounds: Normal breath sounds. No stridor. No wheezing, rhonchi or rales.      Comments: Had some mild scattered rhonchi/wheezing that  cleared with deep coughing.  Abdominal:      General: Abdomen is flat.   Musculoskeletal:  "        General: Normal range of motion.      Right lower leg: No edema.      Left lower leg: No edema.   Skin:     General: Skin is warm and dry.      Nails: There is no clubbing.   Neurological:      General: No focal deficit present.      Mental Status: She is alert and oriented to person, place, and time.   Psychiatric:         Mood and Affect: Mood normal.         Behavior: Behavior normal.         Judgment: Judgment normal.       RESULTS/DATA     Pulmonary Function Test Results   PFT  24: FEV1/FVC: 41, FEV1: 1.01 (46%), FVC: 2.50 (86%), ++BD response, AMF72-52: 16% (187% change with BD admin), T.18 (112%), RV/T%, DLCO: 69%  11: FEV1/FVC: 53, FEV1: 1.46 (57%), FVC: 2.78 (86%), +BD response, WGR80-72: 21%, T.63 (104%), RV/T%, DLCO: 62%    6MWT  24: 258 m. SpO2 addy 91% on room air. Max HR: 89.    Chest Radiograph   CXR  - 23: Impression Normal AP chest.    Chest CT Scan     No results found for this or any previous visit from the past 365 days.      Echocardiogram & Cardiac Studies     No results found for this or any previous visit from the past 365 days.       Labwork & Pathology   Complete Blood Count  Lab Results   Component Value Date    WBC 7.2 2024    HGB 13.9 2024    HCT 43.9 2024    MCV 98 2024     (L) 2024     Peripheral Eosinophil Count:   Eosinophils Absolute (x10*3/uL)   Date Value   2024 0.18   2023 0.00   2023 0.00     Immunocap IgE  Lab Results   Component Value Date    ICIGE 8.28 2024     Bronchoscopy & Sputum Cultures       ASSESSMENT/PLAN     Ms. Harris is a 78 y.o. female; was referred to the [facility] for evaluation of Follow-up and Asthma-COPD overlap syndrome    Problem List and Orders  Diagnoses and all orders for this visit:  Chronic obstructive pulmonary disease, unspecified COPD type (Multi)  -     fluticasone-umeclidin-vilanter (Trelegy Ellipta)  100-62.5-25 mcg blister with device; Inhale 1 puff once daily. Rinse mouth with water after use to reduce aftertaste and incidence of candidiasis. Do not swallow.  -     ipratropium-albuteroL (Duo-Neb) 0.5-2.5 mg/3 mL nebulizer solution; Take 3 mL by nebulization every 6 hours if needed for shortness of breath.  -     albuterol 90 mcg/actuation inhaler; Inhale 2 puffs every 4 hours if needed for wheezing or shortness of breath (Can use 10-15 minutes prior to exertional activity as needed).  Asthma-COPD overlap syndrome (Multi)  Chronic allergic rhinitis  Former cigarette smoker          Assessment and Plan / Recommendations:  Asthma-COPD Overlap: States she was diagnosed with asthma back in the 1970's. Has also dealt with allergy issues all her life. Thinks her breathing got worse around ~2011 when she completed a PFT study which at that time; showed moderate obstruction with +BD response, small airways reactivity and air trapping with moderately reduced DLCO. Was told by Dr. Morin that she has both Asthma w/ COPD. Does not appear to have PFT testing since. For a number of years, has been on nebulized budesonide and Perforomist as her maintenance therapy. Has never been on actual maintenance inhalers before. Will use Albuterol HFA and nebs PRN. Leading up to recent hospitalization in 11/2023; she had been out of all her nebulized medications for some time. Reports having NAIR, chronic cough with clear mucous, frequent wheezing. 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  6-minute walk from 2/16/2024 showed an SpO2 addy of 91% on room air.  Serum eosinophil count from 1/30/2024 was mildly increased at 180 and ImmunoCAP IgE level was normal at 8.   -Continue Trelegy 100; 1 inhalation once a day  -Continue Albuterol HFA and nebs PRN  -Since starting the Trelegy her symptoms have significantly improved and is  feeling much better    2. Former Cigarette Smoker: Smoked upwards of 2 ppd x 33 years. Quit in 1992. 66 total pack years.  -Does not qualify for lung cancer screening protocol given her age and length of quit    3. Chronic Allergies: Reports having allergy issues for decades and has been on allergy shots for significant period of time.  Formerly managed by Dr. Kennedy.  She is in need of a new allergist.  Has been off allergy shots since September 2023.  -Currently established with Dr. Gutierrez  -Take medications per allergy recommendations    RTC 6 months    Davey Parrish, CNP  Associate Pul[facility] Nurse Practitioner    *This note was dictated using DRAGON speech recognition software and was corrected for spelling or grammatical errors, but despite proofreading several typographical errors might be present that might affect the meaning of the content.*

## 2024-10-22 ENCOUNTER — OFFICE VISIT (OUTPATIENT)
Dept: PULMONOLOGY | Facility: CLINIC | Age: 79
End: 2024-10-22
Payer: MEDICARE

## 2024-10-22 VITALS
SYSTOLIC BLOOD PRESSURE: 129 MMHG | BODY MASS INDEX: 30.45 KG/M2 | DIASTOLIC BLOOD PRESSURE: 75 MMHG | WEIGHT: 194 LBS | OXYGEN SATURATION: 94 % | HEART RATE: 57 BPM | HEIGHT: 67 IN

## 2024-10-22 DIAGNOSIS — Z87.891 FORMER CIGARETTE SMOKER: ICD-10-CM

## 2024-10-22 DIAGNOSIS — J30.9 CHRONIC ALLERGIC RHINITIS: ICD-10-CM

## 2024-10-22 DIAGNOSIS — J44.89 ASTHMA-COPD OVERLAP SYNDROME (MULTI): ICD-10-CM

## 2024-10-22 DIAGNOSIS — J44.9 CHRONIC OBSTRUCTIVE PULMONARY DISEASE, UNSPECIFIED COPD TYPE (MULTI): Primary | ICD-10-CM

## 2024-10-22 PROCEDURE — 99214 OFFICE O/P EST MOD 30 MIN: CPT | Performed by: NURSE PRACTITIONER

## 2024-10-22 PROCEDURE — 1160F RVW MEDS BY RX/DR IN RCRD: CPT | Performed by: NURSE PRACTITIONER

## 2024-10-22 PROCEDURE — 1126F AMNT PAIN NOTED NONE PRSNT: CPT | Performed by: NURSE PRACTITIONER

## 2024-10-22 PROCEDURE — 1159F MED LIST DOCD IN RCRD: CPT | Performed by: NURSE PRACTITIONER

## 2024-10-22 PROCEDURE — 1036F TOBACCO NON-USER: CPT | Performed by: NURSE PRACTITIONER

## 2024-10-22 RX ORDER — FLUTICASONE FUROATE, UMECLIDINIUM BROMIDE AND VILANTEROL TRIFENATATE 100; 62.5; 25 UG/1; UG/1; UG/1
1 POWDER RESPIRATORY (INHALATION) DAILY
Qty: 60 EACH | Refills: 5 | Status: SHIPPED | OUTPATIENT
Start: 2024-10-22

## 2024-10-22 RX ORDER — ALBUTEROL SULFATE 90 UG/1
2 INHALANT RESPIRATORY (INHALATION) EVERY 4 HOURS PRN
Qty: 18 G | Refills: 5 | Status: SHIPPED | OUTPATIENT
Start: 2024-10-22

## 2024-10-22 RX ORDER — IPRATROPIUM BROMIDE AND ALBUTEROL SULFATE 2.5; .5 MG/3ML; MG/3ML
3 SOLUTION RESPIRATORY (INHALATION) EVERY 6 HOURS PRN
Qty: 360 ML | Refills: 5 | Status: SHIPPED | OUTPATIENT
Start: 2024-10-22

## 2024-10-22 ASSESSMENT — PAIN SCALES - GENERAL: PAINLEVEL_OUTOF10: 0-NO PAIN

## 2024-10-22 ASSESSMENT — LIFESTYLE VARIABLES: HOW MANY STANDARD DRINKS CONTAINING ALCOHOL DO YOU HAVE ON A TYPICAL DAY: PATIENT DOES NOT DRINK

## 2024-10-22 NOTE — PATIENT INSTRUCTIONS
"Today we discussed how you have been feeling. Happy to hear things are stable.    -Continue Trelegy 100; 1 inhalation once a day.  Rinse mouth after use.    -Continue Albuterol Inhaler; 2 puffs every 4-6 hours as needed for shortness of breath. You can also take this 10-15 minutes prior to exertional activity to help \"prime\" your lungs.  -Continue DuoNeb (albuterol/ipratropium) nebulizer treatments; 1 unit dose every 6 hours as needed for shortness of breath/cough. Can also use these treatments daily/scheduled if you find them helpful.  -Continue allergy medications/treatments as advised by your allergist.    Thank you for visiting the pulmonary clinic today! It was a pleasure to participate in your care.  Please return to clinic 1 year or sooner if needed.    Davey Parrish, CNP  My Office Number: [phone]   CT Scheduling: [phone]  PFT/Follow Up Visit Scheduling: [phone]  My Nurse: MARLENY Walters    To reach the nurse, Selena Tan RN, please call ([phone]) Selena has a secure voice mail account if you want to leave a message.    **For immediate needs such as medication issues/refills, active sick symptoms/medical concerns; I ask that you please call the office and speak to the pulmonary nurse. MyChart messages do not come directly to me. There can sometimes be a delay before I am aware of any messages that were sent. Thank you.**    "

## 2025-04-28 DIAGNOSIS — J44.9 CHRONIC OBSTRUCTIVE PULMONARY DISEASE, UNSPECIFIED COPD TYPE (MULTI): ICD-10-CM

## 2025-04-28 RX ORDER — FLUTICASONE FUROATE, UMECLIDINIUM BROMIDE AND VILANTEROL TRIFENATATE 100; 62.5; 25 UG/1; UG/1; UG/1
1 POWDER RESPIRATORY (INHALATION) DAILY
Qty: 60 EACH | Refills: 5 | Status: SHIPPED | OUTPATIENT
Start: 2025-04-28

## 2025-07-08 DIAGNOSIS — J44.9 CHRONIC OBSTRUCTIVE PULMONARY DISEASE, UNSPECIFIED COPD TYPE (MULTI): ICD-10-CM

## 2025-07-08 RX ORDER — FLUTICASONE FUROATE, UMECLIDINIUM BROMIDE AND VILANTEROL TRIFENATATE 100; 62.5; 25 UG/1; UG/1; UG/1
1 POWDER RESPIRATORY (INHALATION) DAILY
Qty: 60 EACH | Refills: 5 | Status: SHIPPED | OUTPATIENT
Start: 2025-07-08